# Patient Record
Sex: FEMALE | Race: BLACK OR AFRICAN AMERICAN | Employment: UNEMPLOYED | URBAN - METROPOLITAN AREA
[De-identification: names, ages, dates, MRNs, and addresses within clinical notes are randomized per-mention and may not be internally consistent; named-entity substitution may affect disease eponyms.]

---

## 2019-03-28 ENCOUNTER — OFFICE VISIT (OUTPATIENT)
Dept: INTERNAL MEDICINE CLINIC | Age: 49
End: 2019-03-28

## 2019-03-28 VITALS
BODY MASS INDEX: 33.13 KG/M2 | WEIGHT: 187 LBS | TEMPERATURE: 98.5 F | HEIGHT: 63 IN | OXYGEN SATURATION: 95 % | HEART RATE: 75 BPM | SYSTOLIC BLOOD PRESSURE: 102 MMHG | RESPIRATION RATE: 16 BRPM | DIASTOLIC BLOOD PRESSURE: 45 MMHG

## 2019-03-28 DIAGNOSIS — N64.4 BREAST PAIN, LEFT: ICD-10-CM

## 2019-03-28 DIAGNOSIS — R10.30 LOWER ABDOMINAL PAIN: Primary | ICD-10-CM

## 2019-03-28 DIAGNOSIS — Z13.21 SCREENING FOR ENDOCRINE, NUTRITIONAL, METABOLIC AND IMMUNITY DISORDER: ICD-10-CM

## 2019-03-28 DIAGNOSIS — J45.20 MILD INTERMITTENT ASTHMA WITHOUT COMPLICATION: ICD-10-CM

## 2019-03-28 DIAGNOSIS — Z87.19 HISTORY OF UMBILICAL HERNIA: ICD-10-CM

## 2019-03-28 DIAGNOSIS — Z13.220 SCREENING FOR LIPID DISORDERS: ICD-10-CM

## 2019-03-28 DIAGNOSIS — N60.02 BREAST CYST, LEFT: ICD-10-CM

## 2019-03-28 DIAGNOSIS — Z13.0 SCREENING FOR ENDOCRINE, NUTRITIONAL, METABOLIC AND IMMUNITY DISORDER: ICD-10-CM

## 2019-03-28 DIAGNOSIS — Z86.018 HISTORY OF UTERINE FIBROID: ICD-10-CM

## 2019-03-28 DIAGNOSIS — R82.4 KETONURIA: ICD-10-CM

## 2019-03-28 DIAGNOSIS — Z76.89 ENCOUNTER TO ESTABLISH CARE: ICD-10-CM

## 2019-03-28 DIAGNOSIS — Z13.29 SCREENING FOR ENDOCRINE, NUTRITIONAL, METABOLIC AND IMMUNITY DISORDER: ICD-10-CM

## 2019-03-28 DIAGNOSIS — Z13.228 SCREENING FOR ENDOCRINE, NUTRITIONAL, METABOLIC AND IMMUNITY DISORDER: ICD-10-CM

## 2019-03-28 DIAGNOSIS — Z12.39 ENCOUNTER FOR SCREENING FOR MALIGNANT NEOPLASM OF BREAST: ICD-10-CM

## 2019-03-28 LAB
BILIRUB UR QL STRIP: NORMAL
GLUCOSE UR-MCNC: NORMAL MG/DL
KETONES P FAST UR STRIP-MCNC: NORMAL MG/DL
PH UR STRIP: 6 [PH] (ref 4.6–8)
PROT UR QL STRIP: NEGATIVE
SP GR UR STRIP: 1.03 (ref 1–1.03)
UA UROBILINOGEN AMB POC: NORMAL (ref 0.2–1)
URINALYSIS CLARITY POC: CLEAR
URINALYSIS COLOR POC: NORMAL
URINE BLOOD POC: NEGATIVE
URINE LEUKOCYTES POC: NEGATIVE
URINE NITRITES POC: NEGATIVE

## 2019-03-28 RX ORDER — ACETAMINOPHEN AND CODEINE PHOSPHATE 300; 30 MG/1; MG/1
1 TABLET ORAL
Qty: 20 TAB | Refills: 0 | Status: SHIPPED | OUTPATIENT
Start: 2019-03-28 | End: 2019-03-31

## 2019-03-28 RX ORDER — ALBUTEROL SULFATE 90 UG/1
2 AEROSOL, METERED RESPIRATORY (INHALATION)
Qty: 1 INHALER | Refills: 0 | Status: SHIPPED | OUTPATIENT
Start: 2019-03-28 | End: 2021-08-10

## 2019-03-28 RX ORDER — FLUOXETINE HYDROCHLORIDE 20 MG/1
CAPSULE ORAL
Refills: 0 | COMMUNITY
Start: 2019-03-18 | End: 2021-08-10

## 2019-03-28 RX ORDER — TRAZODONE HYDROCHLORIDE 50 MG/1
TABLET ORAL
Refills: 0 | COMMUNITY
Start: 2019-03-18 | End: 2021-08-10

## 2019-03-28 RX ORDER — CEPHALEXIN 500 MG/1
CAPSULE ORAL
Refills: 0 | COMMUNITY
Start: 2019-03-21 | End: 2019-04-12 | Stop reason: ALTCHOICE

## 2019-03-28 RX ORDER — TRAMADOL HYDROCHLORIDE 50 MG/1
TABLET ORAL
Refills: 0 | COMMUNITY
Start: 2019-03-21 | End: 2019-03-28 | Stop reason: ALTCHOICE

## 2019-03-28 RX ORDER — SULFAMETHOXAZOLE AND TRIMETHOPRIM 800; 160 MG/1; MG/1
TABLET ORAL
Refills: 0 | COMMUNITY
Start: 2019-03-21 | End: 2019-04-12 | Stop reason: ALTCHOICE

## 2019-03-28 NOTE — PROGRESS NOTES
Pt is here for   Chief Complaint   Patient presents with    Abdominal Pain     pt states the bottom of the stomach    Skin Problem     Abscess on left breast     Pt states pain level is a 9/10 left breast abscess. 1. Have you been to the ER, urgent care clinic since your last visit? Hospitalized since your last visit? No    2. Have you seen or consulted any other health care providers outside of the [address] [place] [address] since your last visit? Include any pap smears or colon screening.  No

## 2019-03-28 NOTE — PROGRESS NOTES
Twan Sal is a 52 y.o. female and presents with Abdominal Pain (pt states the bottom of the stomach); Skin Problem (Abscess on left breast); and New Order (Nebulizer)    Subjective:  Pt here to establish care. Has had recurrent abscess in her left breast, occurs every 3 -4 years. This episode started 3 weeks ago. Tried warm compresses with little relief. Tried ibuprofen and tylenol. Went to [facility] ER, about 2 weeks ago, given keflex, bactrim, and tramadol; still with left breast pain. Tried eating with meds, but feels lower abd pain from tramadol or trazodone. Asthma Review:  The patient is being seen for follow up of asthma,  currently stable. Asthma symptoms occur: infrequently. Wheezing when present is described as mild and easily relieved with rescue bronchodilator. The patient reports use of a steroid inhaler. Frequency of use of quick-relief meds: rarely. Regimen compliance: The patient reports adherence to this regimen. Lower abd pain, ongoing pain x 1 week. Associated with diarrhea and lower back pain. Lastly concerned for heavier menses and cramping. H/o fibroids. Review of Systems  Constitutional: negative for fevers, chills, anorexia and weight loss  Respiratory:  negative for  Hemoptysis and dyspnea  CV:   negative for chest pain, palpitations, and lower extremity edema  GI:   negative for nausea, vomiting and melena  Endo:               negative for polyuria,polydipsia,polyphagia, and heat intolerance  Genitourinary: negative for frequency, urgency, dysuria, retention, and hematuria  Integument:  negative for rash, ulcerations, and pruritus  Hematologic:  negative for easy bruising and bleeding  Musculoskel: +LBP.  negative for muscle weakness and joint pain/swelling  Neurological:  negative for headaches, dizziness, vertigo,and memory/gait problems  Behavl/Psych: negative for feelings of anxiety, depression, suicide, and mood changes    Past Medical History:   Diagnosis Date    Substance abuse [facility])      Past Surgical History:   Procedure Laterality Date    HX  SECTION      HX TUBAL LIGATION       Social History     Socioeconomic History    Marital status: SINGLE     Spouse name: Not on file    Number of children: Not on file    Years of education: Not on file    Highest education level: Not on file   Tobacco Use    Smoking status: Current Every Day Smoker     Packs/day: 2.00     Years: 15.00     Pack years: 30.00     Types: Cigarettes    Smokeless tobacco: Never Used    Tobacco comment: declineds education   Substance and Sexual Activity    Alcohol use: Yes     Alcohol/week: 0.5 oz     Types: 1 Standard drinks or equivalent per week     Comment: once a week    Drug use: Yes     Types: Cocaine     Comment: relapsed today. Used crack today for first time since     Sexual activity: Yes     Partners: Male   Social History Narrative    55year old AA female admitted voluntarily for CC of SI. PT has a 9th grade Ed., lives alone and is unemployed, She is followed at [facility] by therapist Elba Pritchard and psychiatrist Dr. Judy Garcia.  Pt. Has been using \"crack and alcohol, since I ran out of money to pay for my meds,\"      Family History   Problem Relation Age of Onset    Hypertension Sister     Diabetes Sister     Hypertension Brother     Diabetes Brother     Hypertension Mother     Hypertension Father      Current Outpatient Medications   Medication Sig Dispense Refill    FLUoxetine (PROZAC) 20 mg capsule take 1 capsule by mouth every morning  0    trimethoprim-sulfamethoxazole (BACTRIM DS, SEPTRA DS) 160-800 mg per tablet take 1 tablet by mouth every 12 hours until finished  0    traZODone (DESYREL) 50 mg tablet take 1 tablet by mouth at bedtime if needed  0    cephALEXin (KEFLEX) 500 mg capsule take 1 capsule by mouth every 6 hours  0    albuterol (PROVENTIL HFA, VENTOLIN HFA, PROAIR HFA) 90 mcg/actuation inhaler Take 2 Puffs by inhalation every four (4) hours as needed for Wheezing or Shortness of Breath (persistent coughing). 1 Inhaler 0    acetaminophen-codeine (TYLENOL #3) 300-30 mg per tablet Take 1 Tab by mouth every six (6) hours as needed for Pain for up to 3 days. Max Daily Amount: 4 Tabs. 20 Tab 0    QUEtiapine (SEROQUEL) 100 mg tablet Take 1 Tab by mouth two (2) times a day. Indications: DEPRESSION ASSOCIATED WITH BIPOLAR DISORDER 60 Tab 0     Allergies   Allergen Reactions    Vistaril [Hydroxyzine Pamoate] Hives       Objective:  Visit Vitals  /45 (BP 1 Location: Right arm, BP Patient Position: Sitting)   Pulse 75   Temp 98.5 °F (36.9 °C) (Oral)   Resp 16   Ht 5' 3\" (1.6 m)   Wt 187 lb (84.8 kg)   LMP 03/07/2019 (Approximate)   SpO2 95%   BMI 33.13 kg/m²     Wt Readings from Last 3 Encounters:   03/28/19 187 lb (84.8 kg)   08/07/13 169 lb (76.7 kg)   06/25/13 165 lb 12.8 oz (75.2 kg)     Physical Exam:   General appearance - alert, well appearing, and in no distress. Mental status - A/O x 4, normal mood and affect. Neck -Supple ,normal CSP. FROM, non-tender. No significant adenopathy/thyromegaly. No JVD. Breast- left breast with ayanna sized cyst in areola at 4' clock. No nipple discharge or erythema. TTP. Right breast without any masses, skin changes, or nipple discharge. Chest - CTA. Symmetric chest rise. No wheezing, rales or rhonchi. Heart - Normal rate, regular rhythm. Normal S1, S2. No MGR or clicks. Abdomen - Soft, obese, distended. Normoactive BS in all quadrants. NT, no mass, hernia, or HSM. Ext- Radial, DP pulses, 2+ bilaterally. No pedal edema, clubbing, or cyanosis. Skin-Warm and dry. No hyperpigmentation, ulcerations, or suspicious lesions. Neuro - Normal speech, no focal findings or movement disorder. Normal strength, gait, and muscle tone. Assessment/Plan:  UA neg, but ketones and glucose present. Labs ordered, advised to AVOID soda drinking. Tylenol #3 prescribed and diagnostic mammo ordered.  Finish antibiotics and continue warm compresses. I spent greater than 50% of 45 minute visit counseling patient about diagnostic results, impressions, prognosis, risk/benefits of treatment options, medication management and adequate follow-up, importance of adherence to treatment plan, and risk factor reduction. Medication Side Effects and Warnings were discussed with patient: yes   Patient Labs were reviewed: yes  Patient Past Records were reviewed: yes    See below for other orders   Follow-up and Dispositions    · Return in about 2 weeks (around 4/11/2019) for asthma, mammo f/u, u/s f/u, lab review. ICD-10-CM ICD-9-CM    1. Lower abdominal pain R10.30 789.09 AMB POC URINALYSIS DIP STICK AUTO W/ MICRO      US ABD LTD      US PELV NON OB W TV   2. Encounter to establish care Z76.89 V65.8    3. Breast cyst, left N60.02 610.0 REY MAMMO LT DX INCL CAD   4. Encounter for screening for malignant neoplasm of breast Z12.31 V76.10 REY MAMMO BI SCREENING INCL CAD   5. Ketonuria R82.4 791.6 HEMOGLOBIN A1C WITH EAG   6. History of uterine fibroid Z86.018 V13.29 REFERRAL TO GYNECOLOGY   7. History of umbilical hernia I65.26 X68.35 US ABD LTD   8. Screening for lipid disorders Z13.220 V77.91 LIPID PANEL   9. Screening for endocrine, nutritional, metabolic and immunity disorder L33.70 H44.97 METABOLIC PANEL, COMPREHENSIVE    Z13.21  CBC WITH AUTOMATED DIFF    Z13.228  TSH 3RD GENERATION    Z13.0     10. Mild intermittent asthma without complication L33.95 606.17 albuterol (PROVENTIL HFA, VENTOLIN HFA, PROAIR HFA) 90 mcg/actuation inhaler   11. Breast pain, left N64.4 611.71 acetaminophen-codeine (TYLENOL #3) 300-30 mg per tablet     Orders Placed This Encounter    REY MAMMO BI SCREENING INCL CAD     Standing Status:   Future     Standing Expiration Date:   4/28/2020     Order Specific Question:   Reason for Exam     Answer:   breast cancer screening     Order Specific Question:   Is Patient Pregnant?      Answer:   No    US ABD LTD Standing Status:   Future     Standing Expiration Date:   4/28/2020     Order Specific Question:   Is Patient Pregnant? Answer:   No     Order Specific Question:   Reason for Exam     Answer:   lower abd pain, h/o uterine fibroids     Order Specific Question:   Specific Body Part     Answer:   lower abdomen    US PELV NON OB W TV     Standing Status:   Future     Standing Expiration Date:   4/28/2020     Order Specific Question:   Is Patient Pregnant? Answer:   No     Order Specific Question:   Reason for Exam     Answer:   lower abd pain    REY MAMMO LT DX INCL CAD     Standing Status:   Future     Standing Expiration Date:   4/28/2020     Order Specific Question:   Reason for Exam     Answer:   left breast mass     Order Specific Question:   Is Patient Pregnant?      Answer:   No    METABOLIC PANEL, COMPREHENSIVE    CBC WITH AUTOMATED DIFF    HEMOGLOBIN A1C WITH EAG    LIPID PANEL    TSH 3RD GENERATION    REFERRAL TO GYNECOLOGY     Referral Priority:   Routine     Referral Type:   Consultation     Referral Reason:   Specialty Services Required     Referred to Provider:   Jamie Vigil MD     Number of Visits Requested:   1    AMB POC URINALYSIS DIP STICK AUTO W/ MICRO    FLUoxetine (PROZAC) 20 mg capsule     Sig: take 1 capsule by mouth every morning     Refill:  0    trimethoprim-sulfamethoxazole (BACTRIM DS, SEPTRA DS) 160-800 mg per tablet     Sig: take 1 tablet by mouth every 12 hours until finished     Refill:  0    DISCONTD: traMADol (ULTRAM) 50 mg tablet     Sig: take 1 tablet by mouth every 6 to 8 hours if needed for pain     Refill:  0    traZODone (DESYREL) 50 mg tablet     Sig: take 1 tablet by mouth at bedtime if needed     Refill:  0    cephALEXin (KEFLEX) 500 mg capsule     Sig: take 1 capsule by mouth every 6 hours     Refill:  0    albuterol (PROVENTIL HFA, VENTOLIN HFA, PROAIR HFA) 90 mcg/actuation inhaler     Sig: Take 2 Puffs by inhalation every four (4) hours as needed for Wheezing or Shortness of Breath (persistent coughing). Dispense:  1 Inhaler     Refill:  0    acetaminophen-codeine (TYLENOL #3) 300-30 mg per tablet     Sig: Take 1 Tab by mouth every six (6) hours as needed for Pain for up to 3 days. Max Daily Amount: 4 Tabs. Dispense:  20 Tab     Refill:  0       Mayda MARY Alfaro expressed understanding of plan. An After Visit Summary was offered/printed and given to the patient.

## 2019-03-28 NOTE — PATIENT INSTRUCTIONS
Uterine Fibroids: Care Instructions  Your Care Instructions    Uterine fibroids are growths in the uterus. Fibroids aren't cancer. Doctors don't know what causes fibroids. Fibroids are very common in women during their childbearing years. Fibroids can grow on the inside of the uterus, in the muscle wall of the uterus, or near the outside wall of the uterus. In some women, fibroids cause painful cramps and heavy periods. In these cases, taking anti-inflammatory medicines, birth control pills, or using an intrauterine device (IUD) often helps decrease symptoms. Sometimes surgery is needed to treat fibroids. But if you are near menopause, you may want to wait and see if your symptoms get better. Most fibroids shrink and go away after menopause, when your menstrual periods stop completely. Follow-up care is a key part of your treatment and safety. Be sure to make and go to all appointments, and call your doctor if you are having problems. It's also a good idea to know your test results and keep a list of the medicines you take. How can you care for yourself at home? · If your doctor gave you medicine, take it as exactly as prescribed. Be safe with medicines. Call your doctor if you think you are having a problem with your medicine. · Take anti-inflammatory medicines for pain. These include ibuprofen (Advil, Motrin) and naproxen (Aleve). Read and follow all instructions on the label. · Use heat, such as a hot water bottle or a heating pad set on low, or a warm bath to relax tense muscles and relieve cramping. Put a thin cloth between the heating pad and your skin. Never go to sleep with a heating pad on. · Lie down and put a pillow under your knees. Or, lie on your side and bring your knees up to your chest. These positions may help relieve belly pain or pressure. · Keep track of how many sanitary pads or tampons you use each day. · Get at least 30 minutes of exercise on most days of the week.  Walking is a good choice. You also may want to do other activities, such as running, swimming, cycling, or playing tennis or team sports. · If you bleed longer than usual or have heavy bleeding, take a daily multivitamin with iron. When should you call for help? Call your doctor now or seek immediate medical care if:    · You have severe vaginal bleeding.     · You have new or worse belly or pelvic pain.    Watch closely for changes in your health, and be sure to contact your doctor if:    · You have unusual vaginal bleeding.     · You do not get better as expected. Where can you learn more? Go to http://jyoti-kashmir.info/. Enter B121 in the search box to learn more about \"Uterine Fibroids: Care Instructions. \"  Current as of: May 14, 2018  Content Version: 11.9  © 6185-5212 [employer]. Care instructions adapted under license by [employer] (which disclaims liability or warranty for this information). If you have questions about a medical condition or this instruction, always ask your healthcare professional. [address] any warranty or liability for your use of this information. Learning About Asthma Triggers  What are asthma triggers? When you have asthma, certain things can make your symptoms worse. These are called triggers. Learn what triggers an asthma attack for you, and avoid the triggers when you can. Common triggers include colds, smoke, air pollution, dust, pollen, pets, stress, and cold air. How do asthma triggers affect you? Triggers can make it harder for your lungs to work as they should. They can lead to sudden breathing problems and other symptoms. When you are around a trigger, an asthma attack is more likely. If your symptoms are severe, you may need emergency treatment or have to go to the hospital for treatment. What can you do to avoid triggers? The first thing is to know your triggers.   When you are having symptoms, note the things around you that might be causing them. Then look for patterns that may be triggering your symptoms. Record your triggers on a piece of paper or in an asthma diary. When you have your list of possible triggers, work with your doctor to find ways to avoid them. Avoid colds and flu. Get a pneumococcal vaccine shot. If you have had one before, ask your doctor whether you need a second dose. Get a flu vaccine every year, as soon as it's available. If you must be around people with colds or the flu, wash your hands often. Here are some ways to avoid a few common triggers. · Do not smoke or allow others to smoke around you. If you need help quitting, talk to your doctor about stop-smoking programs and medicines. These can increase your chances of quitting for good. · If there is a lot of pollution, pollen, or dust outside, stay at home and keep your windows closed. Use an air conditioner or air filter in your home. Check your local weather report or newspaper for air quality and pollen reports. What else should you know? · Take your controller medicine every day, not just when you have symptoms. It helps prevent problems before they occur. · Your doctor may suggest that you check how well your lungs are working by measuring your peak expiratory flow (PEF) throughout the day. Your PEF may drop when you are near things that trigger symptoms. Where can you learn more? Go to http://jyoti-kashmir.info/. Enter A498 in the search box to learn more about \"Learning About Asthma Triggers. \"  Current as of: September 5, 2018  Content Version: 11.9  © 7540-6421 [employer]. Care instructions adapted under license by [employer] (which disclaims liability or warranty for this information).  If you have questions about a medical condition or this instruction, always ask your healthcare professional. [place] [address] any warranty or liability for your use of this information.

## 2019-04-12 ENCOUNTER — OFFICE VISIT (OUTPATIENT)
Dept: INTERNAL MEDICINE CLINIC | Age: 49
End: 2019-04-12

## 2019-04-12 VITALS
DIASTOLIC BLOOD PRESSURE: 60 MMHG | TEMPERATURE: 98.1 F | SYSTOLIC BLOOD PRESSURE: 104 MMHG | HEIGHT: 63 IN | OXYGEN SATURATION: 99 % | WEIGHT: 185.8 LBS | BODY MASS INDEX: 32.92 KG/M2 | RESPIRATION RATE: 18 BRPM | HEART RATE: 62 BPM

## 2019-04-12 DIAGNOSIS — N60.02 BREAST CYST, LEFT: ICD-10-CM

## 2019-04-12 DIAGNOSIS — N64.4 BREAST PAIN, LEFT: ICD-10-CM

## 2019-04-12 DIAGNOSIS — J45.40 MODERATE PERSISTENT ASTHMA WITHOUT COMPLICATION: Primary | ICD-10-CM

## 2019-04-12 RX ORDER — FLUTICASONE PROPIONATE 110 UG/1
2 AEROSOL, METERED RESPIRATORY (INHALATION) EVERY 12 HOURS
Qty: 1 INHALER | Refills: 11 | Status: SHIPPED | OUTPATIENT
Start: 2019-04-12 | End: 2021-08-10

## 2019-04-12 RX ORDER — PREDNISONE 20 MG/1
20 TABLET ORAL
Qty: 5 TAB | Refills: 0 | Status: SHIPPED | OUTPATIENT
Start: 2019-04-12 | End: 2021-08-10

## 2019-04-12 NOTE — PROGRESS NOTES
Michael Pacheco is a 52 y.o. female and presents with Asthma and Results    Subjective:  Pt here to f/u asthma and left breast pain. Taking albuterol BID for SOB and wheezing. Feels the same as last visit, especially with left breast pain. Has NOT had mammogram or labs yet due to scheduling conflict, but finished antibiotics. No acute complaints otherwise. Denies side effects from medication. Review of Systems  Constitutional: negative for fevers, chills, anorexia and weight loss  Respiratory:  negative for cough, hemoptysis, dyspnea, and wheezing  CV:   negative for chest pain, palpitations, and lower extremity edema  GI:   negative for nausea, vomiting, diarrhea, abdominal pain, and melena  Endo:               negative for polyuria,polydipsia,polyphagia, and heat intolerance  Genitourinary: negative for frequency, urgency, dysuria, retention, and hematuria  Integument:  negative for rash, ulcerations, and pruritus  Hematologic:  negative for easy bruising and bleeding  Musculoskel: negative for arthralgias, muscle weakness,and joint pain/swelling  Neurological:  negative for headaches, dizziness, vertigo,and memory/gait problems  Behavl/Psych: negative for feelings of anxiety, depression, suicide, and mood changes    Past Medical History:   Diagnosis Date    [facility]      Past Surgical History:   Procedure Laterality Date    HX  SECTION      HX TUBAL LIGATION  2011     Social History     Socioeconomic History    Marital status: SINGLE     Spouse name: Not on file    Number of children: Not on file    Years of education: Not on file    Highest education level: Not on file   Tobacco Use    Smoking status: Current Every Day Smoker     Packs/day: 2.00     Years: 15.00     Pack years: 30.00     Types: Cigarettes    Smokeless tobacco: Never Used    Tobacco comment: declineds education   Substance and Sexual Activity    Alcohol use:  Yes     Alcohol/week: 0.5 oz     Types: 1 Standard drinks or equivalent per week     Comment: once a week    Drug use: Yes     Types: Cocaine     Comment: relapsed today. Used crack today for first time since 7/28    Sexual activity: Yes     Partners: Male   Social History Narrative    55year old AA female admitted voluntarily for CC of SI. PT has a 9th grade Ed., lives alone and is unemployed, She is followed at [facility] by therapist Kandi Biswas and psychiatrist Dr. Antoine Márquez. Pt. Has been using \"crack and alcohol, since I ran out of money to pay for my meds,\"      Family History   Problem Relation Age of Onset    Hypertension Sister     Diabetes Sister     Hypertension Brother     Diabetes Brother     Hypertension Mother     Hypertension Father      Current Outpatient Medications   Medication Sig Dispense Refill    fluticasone propionate (FLOVENT HFA) 110 mcg/actuation inhaler Take 2 Puffs by inhalation every twelve (12) hours. 1 Inhaler 11    predniSONE (DELTASONE) 20 mg tablet Take 20 mg by mouth daily (with breakfast). 5 Tab 0    traZODone (DESYREL) 50 mg tablet take 1 tablet by mouth at bedtime if needed  0    albuterol (PROVENTIL HFA, VENTOLIN HFA, PROAIR HFA) 90 mcg/actuation inhaler Take 2 Puffs by inhalation every four (4) hours as needed for Wheezing or Shortness of Breath (persistent coughing). 1 Inhaler 0    FLUoxetine (PROZAC) 20 mg capsule take 1 capsule by mouth every morning  0    QUEtiapine (SEROQUEL) 100 mg tablet Take 1 Tab by mouth two (2) times a day.  Indications: DEPRESSION ASSOCIATED WITH BIPOLAR DISORDER 60 Tab 0     Allergies   Allergen Reactions    Vistaril [Hydroxyzine Pamoate] Hives       Objective:  Visit Vitals  /60 (BP 1 Location: Left arm, BP Patient Position: Sitting)   Pulse 62   Temp 98.1 °F (36.7 °C) (Oral)   Resp 18   Ht 5' 3\" (1.6 m)   Wt 185 lb 12.8 oz (84.3 kg)   LMP  (LMP Unknown)   SpO2 99%   BMI 32.91 kg/m²     Wt Readings from Last 3 Encounters:   04/12/19 185 lb 12.8 oz (84.3 kg)   03/28/19 187 lb (84.8 kg)   08/07/13 169 lb (76.7 kg)     Physical Exam:   General appearance - alert, well appearing, and in no distress. Mental status - A/O x 4, aloof and anxious mood and affect. Neck -Supple ,normal CSP. FROM, non-tender. No significant adenopathy/thyromegaly. No JVD. Breast- left breast with grape sized cyst palpated at 5 o'clock on areola. Mild TTP. No erythema or drainage. Chest - CTA. Symmetric chest rise. No wheezing, rales or rhonchi. Heart - Normal rate, regular rhythm. Normal S1, S2. No MGR or clicks. Abdomen - Soft,non-distended. Normoactive BS in all quadrants. NT, no mass or HSM. Ext- Radial, DP pulses, 2+ bilaterally. No pedal edema, clubbing, or cyanosis. Skin-Warm and dry. No hyperpigmentation, ulcerations, or suspicious lesions. Neuro - Normal speech, no focal findings or movement disorder. Normal strength, gait, and muscle tone. Assessment/Plan:  Pt asked to get MAMMO ordered last OV. No further pain med or antibiotic needed at this time. flovent added since use of rescue daily and 5 days of prednisone started. Medication Side Effects and Warnings were discussed with patient: yes   Patient Labs were reviewed: yes  Patient Past Records were reviewed: yes    See below for other orders   Follow-up and Dispositions    · Return in about 1 month (around 5/10/2019) for mammo and lab review, asthma f/u. Pt has given consent verbally while in office for [employer]. ICD-10-CM ICD-9-CM    1. Moderate persistent asthma without complication H81.86 314.79 fluticasone propionate (FLOVENT HFA) 110 mcg/actuation inhaler      predniSONE (DELTASONE) 20 mg tablet   2. Breast cyst, left N60.02 610.0    3. Breast pain, left N64.4 611.71      Orders Placed This Encounter    fluticasone propionate (FLOVENT HFA) 110 mcg/actuation inhaler     Sig: Take 2 Puffs by inhalation every twelve (12) hours.      Dispense:  1 Inhaler     Refill:  11    predniSONE (DELTASONE) 20 mg tablet Sig: Take 20 mg by mouth daily (with breakfast). Dispense:  5 Tab     Refill:  0       Mayda Alfaro expressed understanding of plan. An After Visit Summary was offered/printed and given to the patient.

## 2019-04-12 NOTE — PROGRESS NOTES
Chief Complaint   Patient presents with    Asthma    Results     1. Have you been to the ER, urgent care clinic since your last visit? Hospitalized since your last visit? No    2. Have you seen or consulted any other health care providers outside of the [address] [place] [address] since your last visit? Include any pap smears or colon screening.  No

## 2019-04-12 NOTE — PATIENT INSTRUCTIONS
Fibrocystic Breast Changes: Care Instructions  Your Care Instructions  Fibrocystic breast changes cause many small lumps to form in your breast. Some areas of your breast may feel thicker or denser than other areas. Your breasts also may feel sore or tender. You may notice lumps in both breasts around the nipple and in the upper, outer part of the breasts, especially before your menstrual period. The lumps may come and go and change size in just a few days. Fibrocystic breast changes are normal and are not cancer. Treatment is not usually needed. If you have a hard, grainy lump, unusual pain, or nipple discharge, your doctor may order tests to look for a more serious problem. Talk to your doctor about the need for regular mammograms. Follow-up care is a key part of your treatment and safety. Be sure to make and go to all appointments, and call your doctor if you are having problems. It's also a good idea to know your test results and keep a list of the medicines you take. How can you care for yourself at home? · Take an over-the-counter pain medicine, such as acetaminophen (Tylenol), ibuprofen (Advil, Motrin), or naproxen (Aleve). Read and follow all instructions on the label. · Do not take two or more pain medicines at the same time unless the doctor told you to. Many pain medicines have acetaminophen, which is Tylenol. Too much acetaminophen (Tylenol) can be harmful. · Wear a supportive bra, such as a sports bra or jog bra. · You may want to limit caffeine. Some women say that cutting back on caffeine reduces breast tenderness. · A diet very low in fat (about 15% of daily diet) may reduce breast tenderness. Talk to your doctor about whether you should try a very low-fat diet. When should you call for help?   Watch closely for changes in your health, and be sure to contact your doctor if:    · You do not get better as expected.     · Your breast has changed.     · You have pain in your breast.     · You have a discharge from your nipple.     · A breast lump changes or does not go away. Where can you learn more? Go to http://jyoti-kashmir.info/. Enter A038 in the search box to learn more about \"Fibrocystic Breast Changes: Care Instructions. \"  Current as of: May 14, 2018  Content Version: 11.9  © 9850-9402 [employer]. Care instructions adapted under license by [employer] (which disclaims liability or warranty for this information). If you have questions about a medical condition or this instruction, always ask your healthcare professional. [place] [address] any warranty or liability for your use of this information. Breast Lumps: Care Instructions  Your Care Instructions  Breast lumps are common, especially in women between ages 27 and 48. Many women's breasts feel lumpy and tender before their menstrual period. Women also may have lumps when they are breastfeeding. Breast lumps may go away after menopause. All new breast lumps in women after menopause should be checked by a doctor. Although lumps may be normal for you, it is important to have your doctor check any lump or thickness that is not like the rest of your breast to make sure it is not cancer. A lump may be larger, harder, or different from the rest of your breast tissue. Follow-up care is a key part of your treatment and safety. Be sure to make and go to all appointments, and call your doctor if you are having problems. It's also a good idea to know your test results and keep a list of the medicines you take. How can you care for yourself at home? · Make an appointment to have a mammogram and other follow-up visits as recommended by your doctor. When should you call for help?   Watch closely for changes in your health, and be sure to contact your doctor if:    · You do not get better as expected.     · Your breast has changed.     · You have pain in your breast.     · You have a discharge from your nipple.     · A breast lump changes or does not go away. Where can you learn more? Go to http://jyoti-kashmir.info/. Enter I949 in the search box to learn more about \"Breast Lumps: Care Instructions. \"  Current as of: May 14, 2018  Content Version: 11.9  © 0292-8058 [employer]. Care instructions adapted under license by [employer] (which disclaims liability or warranty for this information). If you have questions about a medical condition or this instruction, always ask your healthcare professional. [place] [address] any warranty or liability for your use of this information.

## 2019-09-02 ENCOUNTER — IP HISTORICAL/CONVERTED ENCOUNTER (OUTPATIENT)
Dept: OTHER | Age: 49
End: 2019-09-02

## 2021-08-02 ENCOUNTER — HOSPITAL ENCOUNTER (EMERGENCY)
Age: 51
Discharge: ANOTHER HOSPITAL | End: 2021-08-02
Attending: EMERGENCY MEDICINE | Admitting: EMERGENCY MEDICINE
Payer: MEDICAID

## 2021-08-02 ENCOUNTER — HOSPITAL ENCOUNTER (INPATIENT)
Age: 51
LOS: 8 days | Discharge: OTHER INSTITUTION | DRG: 751 | End: 2021-08-10
Attending: PSYCHIATRY & NEUROLOGY | Admitting: PSYCHIATRY & NEUROLOGY
Payer: MEDICAID

## 2021-08-02 VITALS
BODY MASS INDEX: 30.12 KG/M2 | RESPIRATION RATE: 16 BRPM | OXYGEN SATURATION: 94 % | HEART RATE: 72 BPM | SYSTOLIC BLOOD PRESSURE: 138 MMHG | TEMPERATURE: 98.2 F | HEIGHT: 63 IN | DIASTOLIC BLOOD PRESSURE: 76 MMHG | WEIGHT: 170 LBS

## 2021-08-02 DIAGNOSIS — T50.902A MEDICATION OVERDOSE, INTENTIONAL SELF-HARM, INITIAL ENCOUNTER (HCC): Primary | ICD-10-CM

## 2021-08-02 DIAGNOSIS — F19.10 POLYSUBSTANCE ABUSE (HCC): ICD-10-CM

## 2021-08-02 DIAGNOSIS — R45.851 SUICIDAL IDEATION: ICD-10-CM

## 2021-08-02 DIAGNOSIS — R45.850 HOMICIDAL IDEATIONS: ICD-10-CM

## 2021-08-02 DIAGNOSIS — Z00.8 EVALUATION BY PSYCHIATRIC SERVICE REQUIRED: ICD-10-CM

## 2021-08-02 DIAGNOSIS — R44.3 HALLUCINATIONS: ICD-10-CM

## 2021-08-02 PROBLEM — F32.A DEPRESSION: Status: ACTIVE | Noted: 2021-08-02

## 2021-08-02 PROBLEM — F33.9 MAJOR DEPRESSIVE DISORDER, RECURRENT, UNSPECIFIED (HCC): Status: ACTIVE | Noted: 2021-08-02

## 2021-08-02 LAB
ALBUMIN SERPL-MCNC: 3.6 G/DL (ref 3.5–5)
ALBUMIN/GLOB SERPL: 0.9 {RATIO} (ref 1.1–2.2)
ALP SERPL-CCNC: 74 U/L (ref 45–117)
ALT SERPL-CCNC: 30 U/L (ref 12–78)
AMPHET UR QL SCN: NEGATIVE
ANION GAP SERPL CALC-SCNC: 9 MMOL/L (ref 5–15)
APAP SERPL-MCNC: <2 UG/ML (ref 10–30)
APPEARANCE UR: CLEAR
AST SERPL-CCNC: 34 U/L (ref 15–37)
ATRIAL RATE: 61 BPM
BACTERIA URNS QL MICRO: NEGATIVE /HPF
BARBITURATES UR QL SCN: NEGATIVE
BASOPHILS # BLD: 0 K/UL (ref 0–0.1)
BASOPHILS NFR BLD: 1 % (ref 0–1)
BENZODIAZ UR QL: NEGATIVE
BILIRUB SERPL-MCNC: 0.3 MG/DL (ref 0.2–1)
BILIRUB UR QL: NEGATIVE
BUN SERPL-MCNC: 9 MG/DL (ref 6–20)
BUN/CREAT SERPL: 12 (ref 12–20)
CALCIUM SERPL-MCNC: 8.8 MG/DL (ref 8.5–10.1)
CALCULATED P AXIS, ECG09: 51 DEGREES
CALCULATED R AXIS, ECG10: 45 DEGREES
CALCULATED T AXIS, ECG11: 42 DEGREES
CANNABINOIDS UR QL SCN: NEGATIVE
CHLORIDE SERPL-SCNC: 106 MMOL/L (ref 97–108)
CK MB CFR SERPL CALC: 0.5 % (ref 0–2.5)
CK MB SERPL-MCNC: 1.1 NG/ML (ref 5–25)
CK SERPL-CCNC: 236 U/L (ref 26–192)
CO2 SERPL-SCNC: 29 MMOL/L (ref 21–32)
COCAINE UR QL SCN: POSITIVE
COLOR UR: NORMAL
CREAT SERPL-MCNC: 0.76 MG/DL (ref 0.55–1.02)
DIAGNOSIS, 93000: NORMAL
DIFFERENTIAL METHOD BLD: ABNORMAL
DRUG SCRN COMMENT,DRGCM: ABNORMAL
EOSINOPHIL # BLD: 0.1 K/UL (ref 0–0.4)
EOSINOPHIL NFR BLD: 2 % (ref 0–7)
EPITH CASTS URNS QL MICRO: NORMAL /LPF
ERYTHROCYTE [DISTWIDTH] IN BLOOD BY AUTOMATED COUNT: 21.1 % (ref 11.5–14.5)
ETHANOL SERPL-MCNC: 48 MG/DL
FLUAV RNA SPEC QL NAA+PROBE: NOT DETECTED
FLUBV RNA SPEC QL NAA+PROBE: NOT DETECTED
GLOBULIN SER CALC-MCNC: 4.2 G/DL (ref 2–4)
GLUCOSE SERPL-MCNC: 80 MG/DL (ref 65–100)
GLUCOSE UR STRIP.AUTO-MCNC: NEGATIVE MG/DL
HCT VFR BLD AUTO: 36.9 % (ref 35–47)
HGB BLD-MCNC: 10.9 G/DL (ref 11.5–16)
HGB UR QL STRIP: NEGATIVE
IMM GRANULOCYTES # BLD AUTO: 0 K/UL
IMM GRANULOCYTES NFR BLD AUTO: 0 %
KETONES UR QL STRIP.AUTO: NEGATIVE MG/DL
LEUKOCYTE ESTERASE UR QL STRIP.AUTO: NEGATIVE
LYMPHOCYTES # BLD: 1.7 K/UL (ref 0.8–3.5)
LYMPHOCYTES NFR BLD: 43 % (ref 12–49)
MCH RBC QN AUTO: 24.3 PG (ref 26–34)
MCHC RBC AUTO-ENTMCNC: 29.5 G/DL (ref 30–36.5)
MCV RBC AUTO: 82.4 FL (ref 80–99)
METHADONE UR QL: NEGATIVE
MONOCYTES # BLD: 0.4 K/UL (ref 0–1)
MONOCYTES NFR BLD: 10 % (ref 5–13)
NEUTS SEG # BLD: 1.7 K/UL (ref 1.8–8)
NEUTS SEG NFR BLD: 44 % (ref 32–75)
NITRITE UR QL STRIP.AUTO: NEGATIVE
NRBC # BLD: 0 K/UL (ref 0–0.01)
NRBC BLD-RTO: 0 PER 100 WBC
OPIATES UR QL: NEGATIVE
P-R INTERVAL, ECG05: 174 MS
PCP UR QL: NEGATIVE
PH UR STRIP: 6 [PH] (ref 5–8)
PLATELET # BLD AUTO: 303 K/UL (ref 150–400)
PMV BLD AUTO: 10.7 FL (ref 8.9–12.9)
POTASSIUM SERPL-SCNC: 3.4 MMOL/L (ref 3.5–5.1)
PROT SERPL-MCNC: 7.8 G/DL (ref 6.4–8.2)
PROT UR STRIP-MCNC: NEGATIVE MG/DL
Q-T INTERVAL, ECG07: 438 MS
QRS DURATION, ECG06: 84 MS
QTC CALCULATION (BEZET), ECG08: 440 MS
RBC # BLD AUTO: 4.48 M/UL (ref 3.8–5.2)
RBC #/AREA URNS HPF: NORMAL /HPF (ref 0–5)
RBC MORPH BLD: ABNORMAL
SALICYLATES SERPL-MCNC: 3.8 MG/DL (ref 2.8–20)
SARS-COV-2, COV2: NOT DETECTED
SODIUM SERPL-SCNC: 144 MMOL/L (ref 136–145)
SP GR UR REFRACTOMETRY: 1.02 (ref 1–1.03)
UA: UC IF INDICATED,UAUC: NORMAL
UROBILINOGEN UR QL STRIP.AUTO: 1 EU/DL (ref 0.2–1)
VENTRICULAR RATE, ECG03: 61 BPM
WBC # BLD AUTO: 3.9 K/UL (ref 3.6–11)
WBC URNS QL MICRO: NORMAL /HPF (ref 0–4)

## 2021-08-02 PROCEDURE — 80179 DRUG ASSAY SALICYLATE: CPT

## 2021-08-02 PROCEDURE — 93005 ELECTROCARDIOGRAM TRACING: CPT

## 2021-08-02 PROCEDURE — 85025 COMPLETE CBC W/AUTO DIFF WBC: CPT

## 2021-08-02 PROCEDURE — 81001 URINALYSIS AUTO W/SCOPE: CPT

## 2021-08-02 PROCEDURE — 80053 COMPREHEN METABOLIC PANEL: CPT

## 2021-08-02 PROCEDURE — 80143 DRUG ASSAY ACETAMINOPHEN: CPT

## 2021-08-02 PROCEDURE — 96360 HYDRATION IV INFUSION INIT: CPT

## 2021-08-02 PROCEDURE — 99285 EMERGENCY DEPT VISIT HI MDM: CPT

## 2021-08-02 PROCEDURE — 87636 SARSCOV2 & INF A&B AMP PRB: CPT

## 2021-08-02 PROCEDURE — 80307 DRUG TEST PRSMV CHEM ANLYZR: CPT

## 2021-08-02 PROCEDURE — 82553 CREATINE MB FRACTION: CPT

## 2021-08-02 PROCEDURE — 96361 HYDRATE IV INFUSION ADD-ON: CPT

## 2021-08-02 PROCEDURE — 65220000003 HC RM SEMIPRIVATE PSYCH

## 2021-08-02 PROCEDURE — 82077 ASSAY SPEC XCP UR&BREATH IA: CPT

## 2021-08-02 PROCEDURE — 36415 COLL VENOUS BLD VENIPUNCTURE: CPT

## 2021-08-02 PROCEDURE — 90791 PSYCH DIAGNOSTIC EVALUATION: CPT

## 2021-08-02 PROCEDURE — 74011250636 HC RX REV CODE- 250/636: Performed by: EMERGENCY MEDICINE

## 2021-08-02 PROCEDURE — 82550 ASSAY OF CK (CPK): CPT

## 2021-08-02 RX ORDER — LORAZEPAM 2 MG/ML
1 INJECTION INTRAMUSCULAR AS NEEDED
Status: DISCONTINUED | OUTPATIENT
Start: 2021-08-02 | End: 2021-08-02 | Stop reason: HOSPADM

## 2021-08-02 RX ORDER — MAG HYDROX/ALUMINUM HYD/SIMETH 200-200-20
30 SUSPENSION, ORAL (FINAL DOSE FORM) ORAL
Status: DISCONTINUED | OUTPATIENT
Start: 2021-08-02 | End: 2021-08-10 | Stop reason: HOSPADM

## 2021-08-02 RX ORDER — QUETIAPINE FUMARATE 25 MG/1
50 TABLET, FILM COATED ORAL
Status: DISCONTINUED | OUTPATIENT
Start: 2021-08-02 | End: 2021-08-10 | Stop reason: HOSPADM

## 2021-08-02 RX ORDER — IBUPROFEN 400 MG/1
400 TABLET ORAL
Status: DISCONTINUED | OUTPATIENT
Start: 2021-08-02 | End: 2021-08-10 | Stop reason: HOSPADM

## 2021-08-02 RX ORDER — ACETAMINOPHEN 325 MG/1
650 TABLET ORAL
Status: DISCONTINUED | OUTPATIENT
Start: 2021-08-02 | End: 2021-08-10 | Stop reason: HOSPADM

## 2021-08-02 RX ORDER — IBUPROFEN 200 MG
1 TABLET ORAL
Status: DISCONTINUED | OUTPATIENT
Start: 2021-08-02 | End: 2021-08-03

## 2021-08-02 RX ORDER — TRAZODONE HYDROCHLORIDE 50 MG/1
50 TABLET ORAL
Status: DISCONTINUED | OUTPATIENT
Start: 2021-08-02 | End: 2021-08-06

## 2021-08-02 RX ORDER — HYDROXYZINE PAMOATE 50 MG/1
50 CAPSULE ORAL
Status: DISCONTINUED | OUTPATIENT
Start: 2021-08-02 | End: 2021-08-10 | Stop reason: HOSPADM

## 2021-08-02 RX ORDER — ADHESIVE BANDAGE
30 BANDAGE TOPICAL DAILY PRN
Status: DISCONTINUED | OUTPATIENT
Start: 2021-08-02 | End: 2021-08-10 | Stop reason: HOSPADM

## 2021-08-02 RX ADMIN — SODIUM CHLORIDE 1000 ML: 9 INJECTION, SOLUTION INTRAVENOUS at 03:50

## 2021-08-02 NOTE — ED TRIAGE NOTES
EMS: Drug overdose on Buspar 10 mg tablets. Report she took a handful but is unsure of exactly how much. Bottle filled in Feb 2021. With 30 total. Two left in bottle.  Pt presentation normal. SI attempt

## 2021-08-02 NOTE — BH NOTES
[facility] 60  Master Treatment Plan for Ceron Labrum    Date Treatment Plan Initiated: 08/02/2021    Treatment Plan Modalities:  Type of Modality Amount  (x minutes) Frequency (x/week) Duration (x days) Name of Responsible Staff   [address] [place] [address] meetings to encourage peer interactions 30 14 1 Tera Prado., CNA/MHT  Parviz Vargas., MHT   Group psychotherapy to assist in building coping skills and internal controls 60 5 1 Diane Evans., JOHNNYW  Bj George., [facility]   Therapeutic activity groups to build coping skills 61 7 1 Colby Johnson., T     Psychoeducation in group setting to address:   Medication education  Coping Skills  Symptom Management   60 7 1 Diane Mediate., JOHNNYW  [facility]., [facility]  JOYCE Forrester RN   Discharge planning   60 2 1 Johana Benjamin.,    Spirituality    60 2 1 Rosalia Dash.   Physician medication management   15 7 1 MD JOHN Burnham. Angelo Piper MD                                       Treatment Team Signatures    I have participated in the development of this plan of treatment and agree to its implementation.     Patient Signature       Patient Printed Name Date/Time   Social Work/Therapist Signature       Social Work/Therapist Printed Name Date/Time   RN Signature       RN Printed Name Date/Time   Other Signature     Other Signature Date/Time   MD Signature       MD Printed Name Date/Time

## 2021-08-02 NOTE — ROUTINE PROCESS
Shift change report given to Soraida Ferro re: CARRIE. Medications, and behavior.   Alexis Fall Risk Score (1)

## 2021-08-02 NOTE — ED TRIAGE NOTES
Pt arrives via EMS presenting with SI and overdose. Per EMS, pt took a \"handful\" of 10mg buspirone 2 hours pta that were filled in February of 2021 for 30 day prescription. The pill bottle currently has 2 pills only. Pt does not know how many pills were in the bottle before taking them tonight. Pt reports she is depressed x \"a day or two\" and that she is still feeling suicidal. Pt endorses HI towards her \"ex friend\" and daughter and states, \"when I see them again I'm gonna knock them upside the head, that's how I feel! \" Pt will not state why. Pt endorses AVH of people walking past/near her and states it has been going on \"for a while. \" Pt reports drinking two 12oz beers and smoking crack around 10pm tonight. Pt is alert and oriented x 4. RR even and unlabored. Pt does not appear to be in any distress at this time. Will continue to monitor. Emergency Department Nursing Plan of Care       The Nursing Plan of Care is developed from the Nursing assessment and Emergency Department Attending provider initial evaluation. The plan of care may be reviewed in the ED Provider note.     The Plan of Care was developed with the following considerations:   Patient / Family readiness to learn indicated by:verbalized understanding  Persons(s) to be included in education: patient  Barriers to Learning/Limitations:No    Signed     Brenda Cuevas RN    8/2/2021   3:21 AM

## 2021-08-02 NOTE — BH NOTES
Shift [facility] [place] re: SBAR, medications, and behavior.   Alexis fall risk score: 1

## 2021-08-02 NOTE — ED NOTES
Pt is in a green gown and belongings have been checked. Pt has one bag of belongings labeled and stored away in pt belonging cabinet containing her shirt, shorts, and purse.

## 2021-08-02 NOTE — ED NOTES
Per [facility], poison control was contacted at this time and gave the following recommendations: monitor pt temperature, give pt benzos if needed, and observe pt til she is back to baseline. Poison control states that pt might get more anxious.

## 2021-08-02 NOTE — ED PROVIDER NOTES
EMERGENCY DEPARTMENT HISTORY AND PHYSICAL EXAM      Please note that this dictation was completed with the assistance of \"Dragon\", the computer voice recognition software. Quite often unanticipated grammatical, syntax, homophones, and other interpretive errors are inadvertently transcribed by the computer software. Please disregard these errors and any errors that have escaped final proofreading. Thank you. Patient Name: Navin Cancino  : 1970  MRN: [id number]  History of Presenting Illness     Chief Complaint   Patient presents with    Drug Overdose    Mental Health Problem     History Provided By: Patient and EMS    HPI: Navin Cancino, 46 y.o. female with past medical history as documented below presents to the ED with c/o of intentional OD. Pt arrives via EMS presenting with SI and overdose. Per EMS, pt took a \"handful\" of 10mg buspirone 2 hours pta that were filled in 2021 for 30 day prescription. The pill bottle currently has 2 pills only. Pt does not know how many pills were in the bottle before taking them tonight. Pt reports chronic depression. Still endorsing SI. Pt also notes having HI towards an \"ex friend. \" Pt admits to alcohol ad cocaine use tonight. Pt denies any other exacerbating or ameliorating factors. Additionally, pt specifically denies any recent fever, chills, headache, nausea, vomiting, abdominal pain, CP, SOB, lightheadedness, dizziness, numbness, weakness, lower extremity swelling, heart palpitations, urinary sxs, diarrhea, constipation, melena, hematochezia, cough, or congestion. History limited due to acuity of condition and psych condition. There are no other complaints, changes or physical findings pertinent to the HPI at this time.     PCP: Kishor Zhao NP    Past History   Past Medical History:  Past Medical History:   Diagnosis Date    Substance abuse [facility])        Past Surgical History:  Past Surgical History:   Procedure Laterality Date    HX  SECTION      HX TUBAL LIGATION         Family History:  Family History   Problem Relation Age of Onset    Hypertension Sister     Diabetes Sister     Hypertension Brother     Diabetes Brother     Hypertension Mother     Hypertension Father        Social History:  Social History     Tobacco Use    Smoking status: Current Every Day Smoker     Packs/day: 2.00     Years: 15.00     Pack years: 30.00     Types: Cigarettes    Smokeless tobacco: Never Used   Substance Use Topics    Alcohol use: Yes     Alcohol/week: 0.8 standard drinks     Types: 1 Standard drinks or equivalent per week    Drug use: Yes     Types: Cocaine     Comment: Used crack cocaine 10pm        Allergies: Allergies   Allergen Reactions    Vistaril [Hydroxyzine Pamoate] Hives       Current Medications:  No current facility-administered medications on file prior to encounter. Current Outpatient Medications on File Prior to Encounter   Medication Sig Dispense Refill    fluticasone propionate (FLOVENT HFA) 110 mcg/actuation inhaler Take 2 Puffs by inhalation every twelve (12) hours. (Patient not taking: Reported on 2021) 1 Inhaler 11    predniSONE (DELTASONE) 20 mg tablet Take 20 mg by mouth daily (with breakfast). (Patient not taking: Reported on 2021) 5 Tab 0    FLUoxetine (PROZAC) 20 mg capsule take 1 capsule by mouth every morning (Patient not taking: Reported on 2021)  0    traZODone (DESYREL) 50 mg tablet take 1 tablet by mouth at bedtime if needed (Patient not taking: Reported on 2021)  0    albuterol (PROVENTIL HFA, VENTOLIN HFA, PROAIR HFA) 90 mcg/actuation inhaler Take 2 Puffs by inhalation every four (4) hours as needed for Wheezing or Shortness of Breath (persistent coughing). (Patient not taking: Reported on 2021) 1 Inhaler 0    QUEtiapine (SEROQUEL) 100 mg tablet Take 1 Tab by mouth two (2) times a day.  Indications: DEPRESSION ASSOCIATED WITH BIPOLAR DISORDER (Patient not taking: Reported on 8/2/2021) 60 Tab 0     Review of Systems   A complete ROS was reviewed by me today and was negative, unless otherwise specified below:  Review of Systems   Constitutional: Negative. Negative for chills and fever. HENT: Negative. Negative for congestion and sore throat. Eyes: Negative. Respiratory: Negative. Negative for cough, chest tightness, shortness of breath and wheezing. Cardiovascular: Negative. Negative for chest pain, palpitations and leg swelling. Gastrointestinal: Negative. Negative for abdominal distention, abdominal pain, blood in stool, constipation, diarrhea, nausea and vomiting. Endocrine: Negative. Genitourinary: Negative. Negative for dysuria, flank pain, frequency, hematuria and urgency. Musculoskeletal: Negative. Negative for arthralgias, back pain and myalgias. Skin: Negative. Negative for color change and rash. Neurological: Negative. Negative for dizziness, syncope, speech difficulty, weakness, light-headedness, numbness and headaches. Hematological: Negative. Psychiatric/Behavioral: Positive for agitation, behavioral problems, dysphoric mood, self-injury, sleep disturbance and suicidal ideas. Negative for confusion. The patient is not nervous/anxious. All other systems reviewed and are negative. Physical Exam   Physical Exam  Vitals and nursing note reviewed. Constitutional:       General: She is in acute distress. Appearance: She is well-developed. She is ill-appearing, toxic-appearing and diaphoretic. HENT:      Head: Normocephalic and atraumatic. Mouth/Throat:      Pharynx: No oropharyngeal exudate. Eyes:      Conjunctiva/sclera: Conjunctivae normal.   Cardiovascular:      Rate and Rhythm: Normal rate and regular rhythm. Heart sounds: Normal heart sounds. Pulmonary:      Effort: Pulmonary effort is normal. No respiratory distress. Breath sounds: Normal breath sounds. No wheezing or rales.    Chest: Chest wall: No tenderness. Abdominal:      General: Bowel sounds are normal. There is no distension. Palpations: Abdomen is soft. There is no mass. Tenderness: There is no abdominal tenderness. There is no guarding or rebound. Musculoskeletal:         General: Normal range of motion. Cervical back: Normal range of motion. Skin:     General: Skin is warm. Neurological:      Mental Status: She is alert and oriented to person, place, and time. Cranial Nerves: No cranial nerve deficit. Motor: No abnormal muscle tone. Psychiatric:         Mood and Affect: Affect is labile. Behavior: Behavior is slowed and withdrawn. Thought Content: Thought content is not paranoid or delusional. Thought content includes suicidal ideation. Thought content does not include homicidal ideation. Thought content includes suicidal plan. Thought content does not include homicidal plan. Diagnostic Study Results     Labs -   I have personally reviewed and interpreted all available laboratory results.    Recent Results (from the past 24 hour(s))   EKG, 12 LEAD, INITIAL    Collection Time: 08/02/21  3:24 AM   Result Value Ref Range    Ventricular Rate 61 BPM    Atrial Rate 61 BPM    P-R Interval 174 ms    QRS Duration 84 ms    Q-T Interval 438 ms    QTC Calculation (Bezet) 440 ms    Calculated P Axis 51 degrees    Calculated R Axis 45 degrees    Calculated T Axis 42 degrees    Diagnosis       Normal sinus rhythm  Nonspecific T wave abnormality  Abnormal ECG  No previous ECGs available     CBC WITH AUTOMATED DIFF    Collection Time: 08/02/21  3:31 AM   Result Value Ref Range    WBC 3.9 3.6 - 11.0 K/uL    RBC 4.48 3.80 - 5.20 M/uL    HGB 10.9 (L) 11.5 - 16.0 g/dL    HCT 36.9 35.0 - 47.0 %    MCV 82.4 80.0 - 99.0 FL    MCH 24.3 (L) 26.0 - 34.0 PG    MCHC 29.5 (L) 30.0 - 36.5 g/dL    RDW 21.1 (H) 11.5 - 14.5 %    PLATELET 279 844 - 218 K/uL    MPV 10.7 8.9 - 12.9 FL    NRBC 0.0 0  WBC ABSOLUTE NRBC 0.00 0.00 - 0.01 K/uL    NEUTROPHILS 44 32 - 75 %    LYMPHOCYTES 43 12 - 49 %    MONOCYTES 10 5 - 13 %    EOSINOPHILS 2 0 - 7 %    BASOPHILS 1 0 - 1 %    IMMATURE GRANULOCYTES 0 %    ABS. NEUTROPHILS 1.7 (L) 1.8 - 8.0 K/UL    ABS. LYMPHOCYTES 1.7 0.8 - 3.5 K/UL    ABS. MONOCYTES 0.4 0.0 - 1.0 K/UL    ABS. EOSINOPHILS 0.1 0.0 - 0.4 K/UL    ABS. BASOPHILS 0.0 0.0 - 0.1 K/UL    ABS. IMM. GRANS. 0.0 K/UL    DF MANUAL      RBC COMMENTS ANISOCYTOSIS  2+       METABOLIC PANEL, COMPREHENSIVE    Collection Time: 08/02/21  3:31 AM   Result Value Ref Range    Sodium 144 136 - 145 mmol/L    Potassium 3.4 (L) 3.5 - 5.1 mmol/L    Chloride 106 97 - 108 mmol/L    CO2 29 21 - 32 mmol/L    Anion gap 9 5 - 15 mmol/L    Glucose 80 65 - 100 mg/dL    BUN 9 6 - 20 MG/DL    Creatinine 0.76 0.55 - 1.02 MG/DL    BUN/Creatinine ratio 12 12 - 20      GFR est AA >60 >60 ml/min/1.73m2    GFR est non-AA >60 >60 ml/min/1.73m2    Calcium 8.8 8.5 - 10.1 MG/DL    Bilirubin, total 0.3 0.2 - 1.0 MG/DL    ALT (SGPT) 30 12 - 78 U/L    AST (SGOT) 34 15 - 37 U/L    Alk. phosphatase 74 45 - 117 U/L    Protein, total 7.8 6.4 - 8.2 g/dL    Albumin 3.6 3.5 - 5.0 g/dL    Globulin 4.2 (H) 2.0 - 4.0 g/dL    A-G Ratio 0.9 (L) 1.1 - 2.2     ETHYL ALCOHOL    Collection Time: 08/02/21  3:31 AM   Result Value Ref Range    ALCOHOL(ETHYL),SERUM 48 (H) <98 MG/DL   SALICYLATE    Collection Time: 08/02/21  3:31 AM   Result Value Ref Range    Salicylate level 3.8 2.8 - 20.0 MG/DL   CK W/ REFLX CKMB    Collection Time: 08/02/21  3:31 AM   Result Value Ref Range     (H) 26 - 192 U/L   CK-MB,QUANT.     Collection Time: 08/02/21  3:31 AM   Result Value Ref Range    CK - MB 1.1 <3.6 NG/ML    CK-MB Index 0.5 0.0 - 2.5     URINALYSIS W/ REFLEX CULTURE    Collection Time: 08/02/21  3:49 AM    Specimen: Urine   Result Value Ref Range    Color YELLOW/STRAW      Appearance CLEAR CLEAR      Specific gravity 1.020 1.003 - 1.030      pH (UA) 6.0 5.0 - 8.0      Protein Negative NEG mg/dL    Glucose Negative NEG mg/dL    Ketone Negative NEG mg/dL    Bilirubin Negative NEG      Blood Negative NEG      Urobilinogen 1.0 0.2 - 1.0 EU/dL    Nitrites Negative NEG      Leukocyte Esterase Negative NEG      WBC PENDING /hpf    RBC PENDING /hpf    Epithelial cells PENDING /lpf    Bacteria PENDING /hpf    UA:UC IF INDICATED PENDING        Radiologic Studies -   I have personally reviewed and interpreted all available imaging studies and agree with radiology interpretation and report. No orders to display     CT Results  (Last 48 hours)    None        CXR Results  (Last 48 hours)    None          Medical Decision Making   I reviewed the vital signs, available nursing notes, past medical history, past surgical history, family history and social history. Vital Signs-Reviewed the patient's vital signs. Patient Vitals for the past 24 hrs:   Temp Pulse Resp BP SpO2   08/02/21 0338    137/80    08/02/21 0318 98.3 °F (36.8 °C) 71 16 (!) 157/103 98 %       Pulse Oximetry Analysis - 98% on RA    Cardiac Monitor:   Rate: 71 bpm  The cardiac monitor revealed the following rhythm as interpreted by me: Normal Sinus Rhythm     The cardiac monitor was ordered secondary to the patient's history of overdose and to monitor the patient for dysrhythmia    ED EKG interpretation:  Rhythm: normal sinus rhythm; and regular . Rate (approx.): 61; Axis: normal; P wave: normal; QRS interval: normal ; ST/T wave: normal; Other findings: normal. This EKG was interpreted by Juanita Gallagher MD    Records Reviewed: Nursing Notes, Old Medical Records, Previous electrocardiograms, Previous Radiology Studies and Previous Laboratory Studies    Provider Notes (Medical Decision Making):   Patient presents with acute suicidal ideation and overdose on B[place]ar, will contact poison control. DDx:  2/2 MDD, schizoaffective d/o, bipolar, drug induced, organic cause such as electrolyte anomoly or infection.  Stable vitals and benign exam. No obvious organic causes to explain behavior but will obtain psych labs, UA, UDS and speak with mental health professional about possible admission. Pt is currently voluntary. Sitter at bedside. Will continue to monitor while in ED. ED Course:   I am the first physician for this patient's ED visit today. Initial assessment performed. I discussed presenting problems, concerns and my formulated plan for today's visit with the patient and any available family members at bedside. I encouraged them to ask questions as they arise throughout the visit. Social History     Tobacco Use    Smoking status: Current Every Day Smoker     Packs/day: 2.00     Years: 15.00     Pack years: 30.00     Types: Cigarettes    Smokeless tobacco: Never Used   Substance Use Topics    Alcohol use: Yes     Alcohol/week: 0.8 standard drinks     Types: 1 Standard drinks or equivalent per week    Drug use: Yes     Types: Cocaine     Comment: Used crack cocaine 10pm 8/1     TOBACCO COUNSELING:  Upon evaluation, pt expressed that they are a current tobacco user. For approximately 3-5 mins, pt has been counseled on the dangers of smoking and was encouraged to quit as soon as possible in order to decrease further risks to their health. Pt has conveyed their understanding of the risks involved should they continue to use tobacco products. I reviewed our electronic medical record system for any past medical records that were available that may contribute to the patient's current condition, the nursing notes and vital signs from today's visit. ED Orders Placed :  Orders Placed This Encounter    CBC WITH AUTOMATED DIFF    METABOLIC PANEL, COMPREHENSIVE    BLOOD ALCOHOL (Ethyl Alcohol)    SALICYLATE    CK W/REFLEX CKMB    DRUG SCREEN, URINE    URINALYSIS W/ REFLEX CULTURE    COVID-19 WITH INFLUENZA A/B    CK-MB,QUANT.     ACETAMINOPHEN    CONTACT POISON CONTROL    EKG 12 LEAD INITIAL    INSERT PERIPHERAL IV ONE TIME STAT    SUICIDE PRECAUTIONS    sodium chloride 0.9 % bolus infusion 1,000 mL    IP CONSULT TO BSMART       ED Medications Administered:  Medications   sodium chloride 0.9 % bolus infusion 1,000 mL (1,000 mL IntraVENous New Bag 8/2/21 0350)        Progress Note:  Per Poison control, will monitor temp, give benzo's as needed, and observe until pt is back to baseline. Progress Note:  No events on cardiac monitoring, labs and workup reviewed, pt is medically cleared for psych admission. Consult Note:  Lima Busby MD spoke with Lissy Arrington  Specialty: [facility]  Discussed pt's hx, disposition, and available diagnostic and imaging results. Reviewed care plans. Agree with management and plan thus far. Consultant will evaluate pt. CRITICAL CARE NOTE :  IMPENDING DETERIORATION -Airway, Respiratory, Cardiovascular, CNS and Metabolic  ASSOCIATED RISK FACTORS - Hypotension, Shock, Metabolic changes, Dehydration, Vascular Compromise, CNS Decompensation and Drug overdose  MANAGEMENT- Bedside Assessment and Supervision of Care  INTERPRETATION -  ECG, Blood Pressure and Cardiac Output Measures   INTERVENTIONS - hemodynamic mngmt, Neurologic interventions  and Metobolic interventions  CASE REVIEW - Medical Sub-Specialist, Nursing, Family and Poison Control  TREATMENT RESPONSE -Improved  PERFORMED BY - Self    NOTES   :  I personally spent 55 minutes of critical care time with this patient. This is time spent at this critically ill patient's bedside actively involved in patient care as well as the coordination of care and discussions with the patient's family. This includes time involved in lab review, consultations with specialist, family decision-making, bedside attention and documentation. During this entire length of time I was immediately available to the patient. This does not include time spent on separately reported billable procedures. Critical Care:  The reason for providing this level of medical care for this critically-ill patient was due to a critical illness that impaired one or more vital organ systems, such that there was a high probability of imminent or life-threatening deterioration in the patient's condition. This care involved the highest level of preparedness to intervene urgently. This care involved high complexity decision making to assess, manipulate, and support vital system functions, to treat this degree of vital organ system failure, and to prevent further life threatening deterioration of the patients condition requiring frequent assessments and interventions. Ruma Hamilton MD    Disposition:   ADMIT  Given the patient's current clinical presentation, I have a high level of concern for decompensation if discharged from the emergency department. Patient is being admitted to the hospital.  The results of their tests and reasons for their admission have been discussed with them and/or available family. They convey agreement and understanding for the need to be admitted and for their admission diagnosis. Consultation has been made with the inpatient physician specialist for hospitalization. Diagnosis   Clinical Impression:  1. Medication overdose, intentional self-harm, initial encounter ([facility] [address]    2. Suicidal ideation    3. Evaluation by psychiatric service required    4. Hallucinations    5. Homicidal ideations    6. Polysubstance abuse ([address]        Attestation:  Chance Claudio MD, am the attending of record for this patient. I personally performed the services described in this documentation on this date, 8/2/2021 for patient, Jacoby Holder. I have reviewed the chart and verified that the record is accurate and complete.

## 2021-08-02 NOTE — BH NOTES
Admission Note:    Patient arrived on the unit at 1356 on a voluntary admission from [facility] ER. Patient was escorted on the unit by [facility] and 2 EMT's. Dr. August Oconnor and Nursing Supervisor notified of patient's arrival.  Hospitalist Dr. Charli French notified in [address] [place] [address] for a Medical H&P. Patient denied having any license with the [employer]. Patient smokes 1/2 pack of cigarettes per day. Patient was counseled on smoking cessation to include recognizing danger situations, coping skills, and information on quitting. Nicotine patch 21 mg topically order daily PRN to decrease smoking cessation. Patient also alcohol and drugs. Counseled patient on alcohol and drug abuse. Patient verbalized understanding. Treatment Plan Initiated.

## 2021-08-02 NOTE — ROUTINE PROCESS
TRANSFER - OUT REPORT:    Verbal report given to Rena Prakash (name) on Gretchen Do  being transferred to [facility] room 228-2. (unit) for routine progression of care       Report consisted of patients Situation, Background, Assessment and   Recommendations(SBAR). Information from the following report(s) SBAR, ED Summary and Recent Results was reviewed with the receiving nurse. Lines:   Peripheral IV 08/02/21 Left Antecubital (Active)   Site Assessment Clean, dry, & intact 08/02/21 0333   Phlebitis Assessment 0 08/02/21 0333   Infiltration Assessment 0 08/02/21 0333   Dressing Status Clean, dry, & intact 08/02/21 0333   Dressing Type Transparent;Tape 08/02/21 0333   Hub Color/Line Status Pink;Patent; Flushed 08/02/21 0333   Action Taken Blood drawn 08/02/21 0333   Alcohol Cap Used Yes 08/02/21 0679        Opportunity for questions and clarification was provided.       Patient transported with:   EMS

## 2021-08-02 NOTE — BH NOTES
TRANSFER - IN REPORT:    Verbal report received from Rosanna Divers on Bittinger  being received from [facility] ER(unit) for routine progression of care      Report consisted of patients Situation, Background, Assessment and   Recommendations(SBAR). Information from the following report(s) SBAR, Kardex, ED Summary, MAR, Recent Results and Med Rec Status was reviewed with the receiving nurse. Lines:    Opportunity for questions and clarification was provided. Assessment completed upon patients arrival to unit and care assumed. Patient arrived on the unit @ [phone] via stretcher from [facility] [place] ED and escorted by [facility] and 2 EMT's.

## 2021-08-02 NOTE — BSMART NOTE
Comprehensive Assessment Form Part 1      Section I - Disposition    Axis I - Substance Induced Mood Disorder  Unspecified Depressive Disorder  Cocaine Use Disorder, Moderate  Axis II - Deferred  Axis III -   Past Medical History:   Diagnosis Date    Substance abuse [facility])        The Medical Doctor to Psychiatrist conference was not completed. The Medical Doctor is in agreement with Psychiatrist disposition because of (reason) patient meets criteria for inpatient admission and patient is voluntary. The plan is present for admission. The on-call Psychiatrist consulted was Dr. Rogelio Sarabia. The admitting Psychiatrist will be Dr. Aarti Maldonado. The admitting Diagnosis is TBD. The Payor source is [address] [place] [address]. Section II - Integrated Summary  Summary:  Patient is a 46 y.o. ambulatory female presenting to ED  per triage, \"Pt arrives via EMS presenting with SI and overdose. Per EMS, pt took a \"handful\" of 10mg buspirone 2 hours pta that were filled in February of 2021. The pill bottle currently has 2 pills only. Pt does not know how many pills were in the bottle before taking them tonight. Pt reports she is depressed x \"a day or two\" and that she is still feeling suicidal. Pt endorses HI towards her \"ex friend\" and daughter and states, \"when I see them again I'm gonna knock them upside the head, that's how I feel! \" Pt will not state why. Pt endorses AVH of people walking past/near her and states it has been going on \"for a while. \" Pt reports drinking two 12oz beers and smoking crack around 10pm tonight. Pt is alert and oriented x 4. RR even and unlabored. Pt does not appear to be in any distress at this time. Will continue to monitor. \"    Patient is alert and oriented x 4. Patient is cooperative. Patient had to be redirected approximately 5 times throughout the assessment as patient was falling asleep. Patient reported taking several Buspirone pills tonight in order to kill herself. Patient reported current SI at time of assessment. Patient reported, \"I been depressed a long time. \" Patient reported no specific incident that led up to her current attempt. Patient reported she lives with her daughter and pays rent. Patient reported she paid this months rent and kept calling her daughter and her daughter would not let her in the home. Patient reported a history of suicide attempt in December 2020 when she attempted to take all her medications. Patient denied HI but reported sometimes wanting to harm her ex-boyfriend because \"me and him don't get along. \" Patient reported no plan to harm ex-boyfriend. Patient denied current AVH but reported hearing someone calling her and seeing someone walking near her a few hours ago. Patient has a history of several pscyhiatric hospitalizations. Patient reported being admitted to [facility] 4-5 times with her last admission being about 2 years ago. Patient was admitted to [facility] [place] in 2016 according to medical records. Patient denied seeing a psychiatrist and reported she is not taking any medication currently. Patient reported she has a counselor named Maylin Schwarz that \"I call whenever I have a problem. \" Patient reported talking to Maylin yesterday about \"nothing important. \" Patient reported she did not tell her counselor that she was feeling depressed or suicidal.  When asked about previous mental health diagnoses patient stated, \"not that I know of.\"    Patient is voluntary for admission. Patient will be presented for admission. The patienthas demonstrated mental capacity to provide informed consent. The information is given by the patient and past medical records. The Chief Complaint is SI with plan. The Precipitant Factors are substance use. Previous Hospitalizations: Yes-several  The patient has not previously been in restraints. Current Psychiatrist and/or  is Damon.     Lethality Assessment:    The potential for suicide noted by the following: previous history of attempts, current attempt, ideation and current substance abuse. The potential for homicide is not noted. The patient has not been a perpetrator of sexual or physical abuse. There are not pending charges. The patient is felt to be at risk for self harm or harm to others. The attending nurse was advised the patient needs supervision. Section III - Psychosocial  The patient's overall mood and attitude is cooperative and drowsy. Feelings of helplessness and hopelessness are observed by current suicide attempt. Generalized anxiety is not observed. Panic is not observed. Phobias are not observed. Obsessive compulsive tendencies are not observed. Section IV - Mental Status Exam  The patient's appearance is unkempt. The patient's behavior shows poor impulse control. The patient is oriented to time, place, person and situation. The patient's speech is slurred. The patient's mood. The range of affect shows no evidence of impairment. The patient's thought content demonstrates no evidence of impairment. The thought process shows no evidence of impairment. The patient's perception shows no evidence of impairment. The patient's memory shows no evidence of impairment. The patient's appetite shows no evidence of impairment. The patient's sleep shows no evidence of impairment. The patient shows little insight. The patient's judgement is psychologically impaired. Section V - Substance Abuse  The patient is using substances. The patient is using alcohol for greater than 10 years with last use on today and crack cocaine by inhalation for greater than 10 years with last use on today. The patient has experienced the following withdrawal symptoms: N/A. Section VI - Living Arrangements  The patient is single. The patient lives daughter/ex-boyfriend. The patient has 9 children.   The patient does plan to return home upon discharge. The patient does not have legal issues pending. The patient's source of income comes from disability. Quaker and cultural practices have not been voiced at this time. The patient's greatest support comes from son and this person will not be involved with the treatment. The patient has not been in an event described as horrible or outside the realm of ordinary life experience either currently or in the past.  The patient has not been a victim of sexual/physical abuse. Section VII - Other Areas of Clinical Concern  The highest grade achieved is 9th with the overall quality of school experience being described as not noted. The patient is currently disabled and speaks [place] as a primary language. The patient has no communication impairments affecting communication. The patient's preference for learning can be described as: can read and write adequately.   The patient's hearing is normal.  The patient's vision is normal.      NASIR Reyes

## 2021-08-02 NOTE — BSMART NOTE
Patient accepted to [facility], Room 228-2. Accepting is Dr. Rafael Rome. Nursing report x [phone].

## 2021-08-02 NOTE — H&P
[facility]   Admission History & Physical        2021 3:56 PM  Patient: Solange Alfaro 1970  PCP: Cristhian Mora NP    HISTORY  Chief Complaint: No chief complaint on file. HPI: 46 y.o. female presenting for admission to [facility] for further evaluation and treatment for suicidal ideation and overdose. She  has a past medical history of Substance abuse ([address] . She reports the presenting symptoms included overdose and suicidal ideation. Patient was brought via EMS. Patient had a handful of 10 mg risperidone 2 hours PTA that were feeling 2021 for 30-day prescription. Patient does not know how many bottle filled have taken tonight. Patient repetitively to be depressed for the past 2 days and still think she feel suicidal.  Patient used to live with the daughter but has been asked to move out. Pt endorses HI towards her \"ex friend\" and daughter and states, \"when I see them again I'm gonna knock them upside the head, that's how I feel! \" Pt will not state why. Pt endorses AVH of people walking past/near her and states it has been going on \"for a while. \" Pt reports drinking two 12oz beers and smoking crack around 10pm tonight. Patient denied of any fever, chills, shortness of breath, cough, abdominal pain, chest pain, urinary symptoms or changes in bowel habit. Past Medical History:  Past Medical History:   Diagnosis Date    Substance abuse [facility])        Past Surgical History:  Past Surgical History:   Procedure Laterality Date    HX  SECTION      HX TUBAL LIGATION         Medication:  Prior to Admission medications    Medication Sig Start Date End Date Taking? Authorizing Provider   fluticasone propionate (FLOVENT HFA) 110 mcg/actuation inhaler Take 2 Puffs by inhalation every twelve (12) hours.   Patient not taking: Reported on 2021   Cristhian Mora NP   predniSONE (DELTASONE) 20 mg tablet Take 20 mg by mouth daily (with breakfast). Patient not taking: Reported on 8/2/2021 4/12/19   Chan Callahan NP   FLUoxetine (PROZAC) 20 mg capsule take 1 capsule by mouth every morning  Patient not taking: Reported on 8/2/2021 3/18/19   Provider, Historical   traZODone (DESYREL) 50 mg tablet take 1 tablet by mouth at bedtime if needed  Patient not taking: Reported on 8/2/2021 3/18/19   Provider, Historical   albuterol (PROVENTIL HFA, VENTOLIN HFA, PROAIR HFA) 90 mcg/actuation inhaler Take 2 Puffs by inhalation every four (4) hours as needed for Wheezing or Shortness of Breath (persistent coughing). Patient not taking: Reported on 8/2/2021 3/28/19   Chan Callahan NP   QUEtiapine (SEROQUEL) 100 mg tablet Take 1 Tab by mouth two (2) times a day. Indications: DEPRESSION ASSOCIATED WITH BIPOLAR DISORDER  Patient not taking: Reported on 8/2/2021 10/6/16   Belen Quijano MD       Allergies:  No Active Allergies    Social History:  Social History     Tobacco Use    Smoking status: Current Every Day Smoker     Packs/day: 2.00     Years: 15.00     Pack years: 30.00     Types: Cigarettes    Smokeless tobacco: Never Used   Substance Use Topics    Alcohol use:  Yes     Alcohol/week: 0.8 standard drinks     Types: 1 Standard drinks or equivalent per week    Drug use: Yes     Types: Cocaine     Comment: Used crack cocaine 10pm 8/1       Family History:  Family History   Problem Relation Age of Onset    Hypertension Sister     Diabetes Sister     Hypertension Brother     Diabetes Brother     Hypertension Mother     Hypertension Father        ROS:      Total of 12 systems reviewed as follows:  POSITIVE= bolded text  Negative = text not bolded       General:  fever, chills, sweats, generalized weakness, weight loss/gain, loss of appetite   Eyes:    blurred vision, eye pain, loss of vision, double vision  ENT:    rhinorrhea, pharyngitis   Respiratory:  cough, sputum production, SOB, VILLARREAL, wheezing, pleuritic pain   Cardiology:   chest pain, palpitations, orthopnea, PND, edema, syncope   Gastrointestinal:  abdominal pain , N/V, diarrhea, dysphagia, constipation, bleeding   Genitourinary:  frequency, urgency, dysuria, hematuria, incontinence, prostatism   Muskuloskeletal: arthralgia, myalgia, back pain  Hematology:   easy bruising, nose or gum bleeding, lymphadenopathy   Dermatological: rash, ulceration, pruritis, color change / jaundice  Endocrine:   hot flashes or polydipsia   Neurological:  headache, dizziness, confusion, focal weakness, paresthesia, speech difficulties, memory loss, gait difficulty  Psychological: feelings of anxiety, depression, agitation      PHYSICAL EXAM:  Patient Vitals for the past 24 hrs:   Temp Pulse Resp BP SpO2   08/02/21 1528 98.2 °F (36.8 °C) 70 17 (!) 141/87 95 %   08/02/21 1356 98.2 °F (36.8 °C) 66 16 133/85 94 %       General:    Alert, cooperative, no distress, appears stated age. HEENT: Atraumatic, anicteric sclerae, pink conjunctivae     No oral ulcers, mucosa moist, throat clear, dentition fair  Neck:  Supple, symmetrical;   thyroid non tender  Lungs:   Clear to auscultation bilaterally. No wheezing or rhonchi. No rales. Chest wall:  No tenderness. No accessory muscle use. Heart:   Regular rhythm. No  murmur. No edema  Abdomen:   Soft, non-tender. Not distended. Bowel sounds normal  Extremities: No cyanosis. No clubbing      Capillary refill normal,  Radial pulse 2+,   Skin:     Not pale. Not jaundiced. No rashes   Psych:  Good insight. depressed. anxious or agitated. Neurologic: EOMs intact. No facial asymmetry. No aphasia or slurred speech. Symmetrical strength, Sensation grossly intact. Alert and oriented X 4.     Lab Data Reviewed:    Recent Results (from the past 24 hour(s))   EKG, 12 LEAD, INITIAL    Collection Time: 08/02/21  3:24 AM   Result Value Ref Range    Ventricular Rate 61 BPM    Atrial Rate 61 BPM    P-R Interval 174 ms    QRS Duration 84 ms    Q-T Interval 438 ms    QTC Calculation (Bezet) 440 ms    Calculated P Axis 51 degrees    Calculated R Axis 45 degrees    Calculated T Axis 42 degrees    Diagnosis       Normal sinus rhythm  Nonspecific T wave abnormality  Abnormal ECG  No previous ECGs available  Confirmed by Grayson De Luna M.D., Tiny Christensen ([id number]) on 8/2/2021 8:04:05 AM     CBC WITH AUTOMATED DIFF    Collection Time: 08/02/21  3:31 AM   Result Value Ref Range    WBC 3.9 3.6 - 11.0 K/uL    RBC 4.48 3.80 - 5.20 M/uL    HGB 10.9 (L) 11.5 - 16.0 g/dL    HCT 36.9 35.0 - 47.0 %    MCV 82.4 80.0 - 99.0 FL    MCH 24.3 (L) 26.0 - 34.0 PG    MCHC 29.5 (L) 30.0 - 36.5 g/dL    RDW 21.1 (H) 11.5 - 14.5 %    PLATELET 158 443 - 122 K/uL    MPV 10.7 8.9 - 12.9 FL    NRBC 0.0 0  WBC    ABSOLUTE NRBC 0.00 0.00 - 0.01 K/uL    NEUTROPHILS 44 32 - 75 %    LYMPHOCYTES 43 12 - 49 %    MONOCYTES 10 5 - 13 %    EOSINOPHILS 2 0 - 7 %    BASOPHILS 1 0 - 1 %    IMMATURE GRANULOCYTES 0 %    ABS. NEUTROPHILS 1.7 (L) 1.8 - 8.0 K/UL    ABS. LYMPHOCYTES 1.7 0.8 - 3.5 K/UL    ABS. MONOCYTES 0.4 0.0 - 1.0 K/UL    ABS. EOSINOPHILS 0.1 0.0 - 0.4 K/UL    ABS. BASOPHILS 0.0 0.0 - 0.1 K/UL    ABS. IMM. GRANS. 0.0 K/UL    DF MANUAL      RBC COMMENTS ANISOCYTOSIS  2+       METABOLIC PANEL, COMPREHENSIVE    Collection Time: 08/02/21  3:31 AM   Result Value Ref Range    Sodium 144 136 - 145 mmol/L    Potassium 3.4 (L) 3.5 - 5.1 mmol/L    Chloride 106 97 - 108 mmol/L    CO2 29 21 - 32 mmol/L    Anion gap 9 5 - 15 mmol/L    Glucose 80 65 - 100 mg/dL    BUN 9 6 - 20 MG/DL    Creatinine 0.76 0.55 - 1.02 MG/DL    BUN/Creatinine ratio 12 12 - 20      GFR est AA >60 >60 ml/min/1.73m2    GFR est non-AA >60 >60 ml/min/1.73m2    Calcium 8.8 8.5 - 10.1 MG/DL    Bilirubin, total 0.3 0.2 - 1.0 MG/DL    ALT (SGPT) 30 12 - 78 U/L    AST (SGOT) 34 15 - 37 U/L    Alk.  phosphatase 74 45 - 117 U/L    Protein, total 7.8 6.4 - 8.2 g/dL    Albumin 3.6 3.5 - 5.0 g/dL    Globulin 4.2 (H) 2.0 - 4.0 g/dL    A-G Ratio 0.9 (L) 1.1 - 2.2     ETHYL ALCOHOL    Collection Time: 08/02/21  3:31 AM   Result Value Ref Range    ALCOHOL(ETHYL),SERUM 48 (H) <03 MG/DL   SALICYLATE    Collection Time: 08/02/21  3:31 AM   Result Value Ref Range    Salicylate level 3.8 2.8 - 20.0 MG/DL   CK W/ REFLX CKMB    Collection Time: 08/02/21  3:31 AM   Result Value Ref Range     (H) 26 - 192 U/L   CK-MB,QUANT. Collection Time: 08/02/21  3:31 AM   Result Value Ref Range    CK - MB 1.1 <3.6 NG/ML    CK-MB Index 0.5 0.0 - 2.5     ACETAMINOPHEN    Collection Time: 08/02/21  3:31 AM   Result Value Ref Range    Acetaminophen level <2 (L) 10 - 30 ug/mL   DRUG SCREEN, URINE    Collection Time: 08/02/21  3:49 AM   Result Value Ref Range    AMPHETAMINES Negative NEG      BARBITURATES Negative NEG      BENZODIAZEPINES Negative NEG      COCAINE Positive (A) NEG      METHADONE Negative NEG      OPIATES Negative NEG      PCP(PHENCYCLIDINE) Negative NEG      THC (TH-CANNABINOL) Negative NEG      Drug screen comment (NOTE)    URINALYSIS W/ REFLEX CULTURE    Collection Time: 08/02/21  3:49 AM    Specimen: Urine   Result Value Ref Range    Color YELLOW/STRAW      Appearance CLEAR CLEAR      Specific gravity 1.020 1.003 - 1.030      pH (UA) 6.0 5.0 - 8.0      Protein Negative NEG mg/dL    Glucose Negative NEG mg/dL    Ketone Negative NEG mg/dL    Bilirubin Negative NEG      Blood Negative NEG      Urobilinogen 1.0 0.2 - 1.0 EU/dL    Nitrites Negative NEG      Leukocyte Esterase Negative NEG      WBC 0-4 0 - 4 /hpf    RBC 0-5 0 - 5 /hpf    Epithelial cells FEW FEW /lpf    Bacteria Negative NEG /hpf    UA:UC IF INDICATED CULTURE NOT INDICATED BY UA RESULT CNI     COVID-19 WITH INFLUENZA A/B    Collection Time: 08/02/21  3:49 AM   Result Value Ref Range    SARS-CoV-2 Not detected NOTD      Influenza A by PCR Not detected      Influenza B by PCR Not detected         EKG: normal EKG, normal sinus rhythm, unchanged from previous tracings.      Radiology:  No orders to display       Care Plan discussed with:   Patient x    Family     RN x         Consultant      Expected  Disposition:   Home with Family x   HH/PT/OT/RN    SNF/LTC    MERYL      TOTAL TIME:  60 Minutes      Comments    x Reviewed previous records   >50% of visit spent in counseling and coordination of care x Discussion with patient and/or family and questions answered       _______________________________________________________  Given the patient's current clinical presentation, I have a high level of concern for decompensation if discharged from the emergency department. Complex decision making was performed, which includes reviewing the patient's available past medical records, laboratory results, and x-ray films. My assessment of this patient's clinical condition and my plan of care is as follows. ASSESSMENT / PLAN    Active Problems:    Substance induced mood disorder ([facility] (10/4/2016)      Major depressive disorder, recurrent, unspecified ([facility] (8/2/2021)      Depression (8/2/2021)      80-year-old female with history of substance abuse/mood disorder who presented via EMS for suicidal ideation and severe depression.    -Admit inpatient to psychiatric unit.  -Suicidal safety precaution. Psychiatry consult. Urine tox positive for cocaine.  -Patient does not have any medical problems/not taking any medication. Labs within normal limit.  -Anxiety/anger management. Group therapy. Depression medication per psychiatry recommendation.  -Will need placement at the time of discharge. Has remained homeless.     SAFETY:   Code Status:Full  DVT prophylaxis:No VTE Prophylaxis Needed  Stress Ulcer prophylaxis: Not Indicated  Bladder catheter:no  Family Contact Info:  Primary Emergency ContactSNéstor Persaud Phone: [phone]  Bedded: [facility] Room 228/02  Disposition: TBD, likely home when stable  Admission status:  Inpatient    -Tentative plan of care discussed with patient / family, who demonstrated understanding and is in agreement to the above  -Case was reviewed with the ED Provider, MD Cornell Velasco Head, MD  [facility] Hospitalist  [phone]

## 2021-08-02 NOTE — PROGRESS NOTES
Problem: Suicide  Goal: *STG: Remains safe in hospital  Outcome: Progressing Towards Goal     Problem: Depressed Mood (Adult/Pediatric)  Goal: *STG: Remains safe in hospital  Outcome: Progressing Towards Goal  Goal: *STG: Complies with medication therapy  Outcome: Progressing Towards Goal

## 2021-08-02 NOTE — ED NOTES
Pt currently in route to [facility] via [facility] in no acute distress.

## 2021-08-03 LAB
CHOLEST SERPL-MCNC: 131 MG/DL
COMMENT, HOLDF: NORMAL
HDLC SERPL-MCNC: 55 MG/DL
HDLC SERPL: 2.4 {RATIO} (ref 0–5)
LDLC SERPL CALC-MCNC: 68.2 MG/DL (ref 0–100)
SAMPLES BEING HELD,HOLD: NORMAL
TRIGL SERPL-MCNC: 39 MG/DL (ref ?–150)
TSH SERPL DL<=0.05 MIU/L-ACNC: 2.18 UIU/ML (ref 0.36–3.74)
VLDLC SERPL CALC-MCNC: 7.8 MG/DL

## 2021-08-03 PROCEDURE — 84443 ASSAY THYROID STIM HORMONE: CPT

## 2021-08-03 PROCEDURE — 65220000003 HC RM SEMIPRIVATE PSYCH

## 2021-08-03 PROCEDURE — 74011250637 HC RX REV CODE- 250/637: Performed by: PSYCHIATRY & NEUROLOGY

## 2021-08-03 PROCEDURE — 80061 LIPID PANEL: CPT

## 2021-08-03 PROCEDURE — 36415 COLL VENOUS BLD VENIPUNCTURE: CPT

## 2021-08-03 PROCEDURE — 90791 PSYCH DIAGNOSTIC EVALUATION: CPT | Performed by: PSYCHIATRY & NEUROLOGY

## 2021-08-03 RX ORDER — DM/P-EPHED/ACETAMINOPH/DOXYLAM 30-7.5/3
2 LIQUID (ML) ORAL
Status: DISCONTINUED | OUTPATIENT
Start: 2021-08-03 | End: 2021-08-10 | Stop reason: HOSPADM

## 2021-08-03 RX ORDER — OXCARBAZEPINE 300 MG/1
300 TABLET, FILM COATED ORAL 2 TIMES DAILY
Status: DISCONTINUED | OUTPATIENT
Start: 2021-08-03 | End: 2021-08-04

## 2021-08-03 RX ORDER — OXCARBAZEPINE 150 MG/1
150 TABLET, FILM COATED ORAL
Status: COMPLETED | OUTPATIENT
Start: 2021-08-03 | End: 2021-08-03

## 2021-08-03 RX ADMIN — OXCARBAZEPINE 300 MG: 300 TABLET, FILM COATED ORAL at 17:43

## 2021-08-03 RX ADMIN — OXCARBAZEPINE 150 MG: 150 TABLET, FILM COATED ORAL at 09:47

## 2021-08-03 NOTE — ROUTINE PROCESS
Shift change report given to Laurent Garcia Rn, re: SBAR. Medications, and behavior.   Alexis Fall Risk Score (1)

## 2021-08-03 NOTE — PROGRESS NOTES
Problem: Falls - Risk of  Goal: *Absence of Falls  Description: Document Hali Pillow Fall Risk and appropriate interventions in the flowsheet.   Outcome: Progressing Towards Goal  Note: Fall Risk Interventions:            Medication Interventions: Teach patient to arise slowly                   Problem: Patient Education: Go to Patient Education Activity  Goal: Patient/Family Education  Outcome: Progressing Towards Goal     Problem: Suicide  Goal: *STG: Remains safe in hospital  Outcome: Progressing Towards Goal

## 2021-08-03 NOTE — GROUP NOTE
[facility]  GROUP DOCUMENTATION INDIVIDUAL                                                                          Group Therapy Note    Date: 8/3/2021    Group Start Time: 1500  Group End Time: 6659  Group Topic: Nursing    Neelam [id number], RN    IP [address] [facility] GROUP DOCUMENTATION GROUP    Group Therapy Note    Attendees: 4         Attendance: Attended    Patient's Goal:  Patient will be compliant with medication education regimen daily. Interventions/techniques: Provide feedback    Follows Directions: Followed directions    Interactions: Interacted appropriately    Mental Status: Blunted and Depressed    Behavior/appearance: Attentive, Cooperative and Needed prompting    Goals Achieved: Able to engage in interactions, Able to give feedback to another and Able to receive feedback      Additional Notes:  Patient attended and engaged in today's Nursing Medication Education Group with prompting. Patient provided written handouts.     Addie Flores RN

## 2021-08-03 NOTE — GROUP NOTE
[facility]  GROUP DOCUMENTATION INDIVIDUAL                                                                          Group Therapy Note    Date: 8/3/2021    Group Start Time: 1300  Group End Time: 1350  Group Topic: Process Group - Inpatient    [address], [address] [facility] GROUP DOCUMENTATION GROUP    Group Therapy Note    Attendees: Focus of session was on developing simple and practical action plans to start solving the problems that we are dealing with right now. We spoke about how we need to develop the attitude and belief of moving from being a patient to whom things are done to being a person who does things. We discussed a simple action plan of I want to _______ and then focus on the questions of who can help me and how can they help me, how will I know when I have achieved it, what are my next best steps, why is it important to me, when do I want to achieve it, and what resources do I have? We spoke about having the plan be specific, time-framed, achievable, and reviewable in order to be useful. Attendance: Attended    Patient's Goal:       Demonstrates reduction in symptoms and increase in insight into coping skills/future focused             Interventions/techniques: Informed, Validated, Promoted peer support and Supported    Follows Directions: Followed directions    Interactions: Interacted appropriately    Mental Status: Calm, Congruent, Labile and Preoccupied    Behavior/appearance: Attentive, Cooperative, Needed prompting and Negative    Goals Achieved: Able to engage in interactions, Able to listen to others, Able to self-disclose, Discussed coping, Discussed discharge plans, Identified feelings, Identified triggers and Identified relapse prevention strategies      Additional Notes: Carroll Velazquez participated in group with prompting. She spoke about how she knows she has an \"anger problem\" and that she has been struggling with finding ways to manage it.   She did talk about how she does want to get a handle on it and she spoke about the anger she can have towards her ex boyfriend. We spoke about coping strategies particularly for anger.       Manfred Garcia

## 2021-08-03 NOTE — H&P
[facility] HISTORY AND PHYSICAL    Name:  Laurita Trotter  MR#:  [id number]  :  1970  ACCOUNT #:  [de-identified]  ADMIT DATE:  2021    [facility] AD[facility]    HISTORY OF PRESENT ILLNESS:  The patient is a 54-year-old single  female who has a lengthy past psychiatric history of depressive episodes, as well as a likely history of significant cocaine dependence. She was admitted voluntarily from the [facility] emergency room after presenting there with some suicidal thoughts and having taken a small overdose of a \"handful\" of BuSpar pills. She states she had been feeling depressed for only 1-2 days, but she also was feeling angry, stating that her daughter and \"ex\" friend had taken advantage of her. She indicated she had not been on any medication for sometime, either. Apparently, she was living with her daughter and she tells me she is paying rent, but the daughter \"put me out\" recently and took some or most of her money. It may be the case that she got her SSI check on  and used much/most of it on the cocaine, as she admits to smoking crack cocaine recently. It is also likely the case that \"emotional crash\" from the cocaine use has contributed to her sense of hopelessness and the depression as well as the overdose. She states that she feels a little bit better here knowing she is getting some help, and that her mood is not quite as despondent as it was a day or two ago. However, she still feels somewhat depressed and states that she really has nowhere to go, although historically, she has been fairly resourceful at finding options. It was reported that she may have had some auditory or even visual hallucinations of people walking by her or near her but she denies any such symptoms currently and there is no evidence of any psychosis such as other hallucinations or delusional thinking.   It should be noted that she did take a prior overdose in 12/2020 and possibly one or more in the past as well. There is no evidence of any mandeep or hypomania and no evidence of significant anxiety symptoms. PAST PSYCHIATRIC HISTORY:  She has been hospitalized several times previously 4 or 5 at [facility], most recent of which was about a year ago by her report. She also was hospitalized at [facility] in 2016 under similar circumstances. She is followed at the [facility] for skill building and sees Comfort Garland as her . She previously had been followed by the [facility] but refuses to go back for services with them. PAST MEDICAL HISTORY:  Essentially none except for history of some prior asthma. MEDICATIONS ON ADMISSION:  Essentially none at this point. She most recently was on some Vistaril and BuSpar but has not taken it in sometime. ALLERGIES:  NO KNOWN ALLERGIES. FAMILY HISTORY:  She denies any family psychiatric history. SOCIAL HISTORY:  She is single and has never been . She tells me she has 9 children. She does receive SSI. She quit school in the 9th grade. She was living with her daughter and an ex-boyfriend, but apparently has been kicked out. She does smoke about half a pack of cigarettes per day. MENTAL STATUS EXAM:  The patient was noted to be a casually dressed, adequately groomed 44-year-old who appeared her stated age. She was reasonably pleasant and cooperative with the interview and was not overly guarded or evasive. She endorsed some depressive symptoms as indicated above, but states she is not feeling fully hopeless and not having any suicidal thoughts currently. She has had some neurovegetative impairment recently but mostly only over the last several days. She denies any symptoms of psychosis at this point such as any hallucinations or delusional thinking, and none were noted objectively. Her judgment and insight are obviously impulsive and poor. Her cognitive functioning shows a below average fund of knowledge, but no deficits otherwise. DIAGNOSTIC IMPRESSION:  AXIS I:  1. Unspecified depressive disorder (F33.9). 2.  Cocaine use disorder with depressed mood (F14.24). AXIS II:  Deferred. AXIS III:  History of asthma; recent overdose. AXIS IV:  Stressors are moderate (homelessness). AXIS V:  Global Assessment of Functioning currently is 45-50. PLAN:  1. We will admit the patient for further supportive treatment, close observation, increased structure, and involvement within the groups and milieu. 2.  We will begin a trial on Trileptal taken 150 mg this morning and 300 mg b.i.d., to try to help stabilize some of the mood instability that she reports. 3.  We will also order trazodone and Vistaril on a p.r.n. basis for sleep and anxiety respectively. 4.  Estimated length of stay would likely be on the order of 3-5 days depending upon her response to treatment. I certify that this patients psychiatric hospital admission is medically necessary for treatment which could reasonably be expected to improve their condition and/or for diagnostic study. The inpatient psychiatric services are provided while the patient is under the care of a physician and are included in the individualized plan of care.      Nando Perkins MD      [id number]  D:  08/03/2021 10:38  T:  08/03/2021 11:20  JOB #:  [id number]

## 2021-08-03 NOTE — BH NOTES
Patient was admitted voluntarily to the unit for SI/hi. Patient stated she did not have a triggered and was unsure why she was having these thoughts of wanting to harm others. She stated that she always has had depression. She is not in treatment and has not been currently taking any medications. She lives with her daughter and pays rent. She is denying SI/HI currently and has been appropriate with peers and staff.

## 2021-08-03 NOTE — BH NOTES
PSYCHOSOCIAL ASSESSMENT  :Patient identifying info: Clau Cruz is a 46 y.o., female admitted 8/2/2021  1:47 PM     Presenting problem and precipitating factors: reporting SI with an overdose, reports feeling depressed and HI towards ex friend and daughter, later denied feeling SI/HI      Mental status assessment: pleasant and cooperative    Strengths:    Collateral information: patient    Current psychiatric /substance abuse providers and contact info: talks with a lady name Lanie with [facility] [address] when she needs to    Previous psychiatric/substance abuse providers and response to treatment: denies    Family history of mental illness or substance abuse: denies    Substance abuse history:  Alcohol, crack  Social History     Tobacco Use    Smoking status: Current Every Day Smoker     Packs/day: 2.00     Years: 15.00     Pack years: 30.00     Types: Cigarettes    Smokeless tobacco: Never Used   Substance Use Topics    Alcohol use: Yes     Alcohol/week: 0.8 standard drinks     Types: 1 Standard drinks or equivalent per week       History of biomedical complications associated with substance abuse : denies    Patient's current acceptance of treatment or motivation for change: no desire to change    Family constellation: 9 children    Is significant other involved?        Describe support system:     Describe living arrangements and home environment: lives with her daughter    Health issues:   Hospital Problems  Date Reviewed: 8/2/2021        Codes Class Noted POA    * (Principal) Major depressive disorder, recurrent, unspecified ([facility] ICD-10-CM: F33.9  ICD-9-CM: 296.30  8/2/2021 Yes        Depression ICD-10-CM: F32.9  ICD-9-CM: 261  8/2/2021 Unknown        Cocaine dependence with cocaine-induced mood disorder ([facility] ICD-10-CM: F14.24  ICD-9-CM: 292.84, E980.4  10/4/2016               Trauma history: denies    Legal issues: denies    History of  service: denies    Financial status: SSI    Jew/cultural factors: none expressed    Education/work history: 9th grade    Have you been licensed as a health care professional (current or ): no    Leisure and recreation preferences: watch tv    Describe coping skills: unable to identify skills    Callie Apgar  8/3/2021

## 2021-08-03 NOTE — BH NOTES
Shift change report given to Sujatha Niño ,RN re: SBAR, medications, and behavior.   Alexis fall risk score1

## 2021-08-03 NOTE — PROGRESS NOTES
Problem: Falls - Risk of  Goal: *Absence of Falls  Description: Document Odalis Gomez Fall Risk and appropriate interventions in the flowsheet.   Outcome: Progressing Towards Goal  Note: Fall Risk Interventions:            Medication Interventions: Teach patient to arise slowly                   Problem: Suicide  Goal: *STG: Attends activities and groups  Outcome: Progressing Towards Goal  Goal: *STG/LTG: Complies with medication therapy  Outcome: Progressing Towards Goal     Problem: Depressed Mood (Adult/Pediatric)  Goal: *STG: Attends activities and groups  Outcome: Progressing Towards Goal  Goal: *STG: Remains safe in hospital  Outcome: Progressing Towards Goal  Goal: *STG: Complies with medication therapy  Outcome: Progressing Towards Goal

## 2021-08-03 NOTE — GROUP NOTE
RAYMON  GROUP DOCUMENTATION INDIVIDUAL                                                                          Group Therapy Note    Date: 8/2/2021    Group Start Time: 2000  Group End Time: 2030  Group Topic: Community Meeting    [address], [address], CNA    IP [address] [facility] GROUP DOCUMENTATION GROUP    Group Therapy Note    Attendees: 4/6         Attendance: Attended    Patient's Goal:  To get better    Interventions/techniques: Supported    Follows Directions:  Followed directions    Interactions: Interacted appropriately    Mental Status: Calm    Behavior/appearance: Cooperative    Goals Achieved: Able to engage in interactions      Additional Notes:  She has no anxiety, depression, or pain at this time    Navin Marsh CNA

## 2021-08-03 NOTE — BH NOTES
Affect blunted, mood depressed. Pt attended and participated in scheduled group activities. Pt was appropriate and interacted well with staff and peers. Pt denies SI/HI/AVH/pain. Pt ate 100% of snack. Pt has no scheduled medications for night time and did not request a PRN. Pt is in no apparent distress at this time and made no delusional statements. Pt rested in her bed with her eyes closed for 7 hours.

## 2021-08-03 NOTE — GROUP NOTE
RAYMON  GROUP DOCUMENTATION INDIVIDUAL                                                                          Group Therapy Note    Date: 8/3/2021    Group Start Time: 8626  Group End Time: 1739  Group Topic: Community Meeting    [address] [facility] [address] [facility] GROUP DOCUMENTATION GROUP    Group Therapy Note    Attendees: 3         Attendance: Attended    Patient's Goal:  To stay awake and not sleep all day. Interventions/techniques: Provide feedback    Follows Directions: Followed directions    Interactions: Interacted appropriately    Mental Status: Depressed    Behavior/appearance: Cooperative    Goals Achieved: Able to listen to others      Additional Notes:  Patient appetite is good, no physical pain, no suicidal thoughts. Patient stated that she has depression level 4, no anxiety.     Meghan Stager

## 2021-08-03 NOTE — BH NOTES
Affect constricted, mood depressed/anxious. Alert and Oriented X 4. Cooperative and pleasant. Attended and participated in group. Interacted with staff and peers. Makes needs known. Ate all meals and snacks. Denied SI/HI/AVH and pain. Medication compliant. Stable with no s/s of distress.

## 2021-08-04 PROCEDURE — 74011250637 HC RX REV CODE- 250/637: Performed by: PSYCHIATRY & NEUROLOGY

## 2021-08-04 PROCEDURE — 99231 SBSQ HOSP IP/OBS SF/LOW 25: CPT | Performed by: PSYCHIATRY & NEUROLOGY

## 2021-08-04 PROCEDURE — 65220000003 HC RM SEMIPRIVATE PSYCH

## 2021-08-04 RX ORDER — OXCARBAZEPINE 300 MG/1
600 TABLET, FILM COATED ORAL 2 TIMES DAILY
Status: DISCONTINUED | OUTPATIENT
Start: 2021-08-04 | End: 2021-08-10 | Stop reason: HOSPADM

## 2021-08-04 RX ADMIN — OXCARBAZEPINE 600 MG: 300 TABLET, FILM COATED ORAL at 17:09

## 2021-08-04 RX ADMIN — OXCARBAZEPINE 300 MG: 300 TABLET, FILM COATED ORAL at 08:13

## 2021-08-04 RX ADMIN — ACETAMINOPHEN 650 MG: 325 TABLET ORAL at 21:13

## 2021-08-04 NOTE — GROUP NOTE
RAYMON  GROUP DOCUMENTATION INDIVIDUAL                                                                          Group Therapy Note    Date: 8/3/2021    Group Start Time: 2000  Group End Time: 2025  Group Topic: Community Meeting    [address] GROUP DOCUMENTATION GROUP    Group Therapy Note    Attendees: 4         Attendance: Attended    Patient's Goal:  To stay awake and not sleep all day . Interventions/techniques: Provide feedback    Follows Directions: Followed directions    Interactions: Interacted appropriately    Mental Status: Anxious, Calm and Depressed    Behavior/appearance: Cooperative    Goals Achieved: Able to listen to others, Able to self-disclose and Identified feelings      Additional Notes:  Patient said she had a good day of not sleeping all day . Patient said her depression is 7/10 . Patient said she has no pain , no anxiety ,and no SI .     [facility]

## 2021-08-04 NOTE — PROGRESS NOTES
Problem: Falls - Risk of  Goal: *Absence of Falls  Description: Document Hali Pillow Fall Risk and appropriate interventions in the flowsheet.   Outcome: Progressing Towards Goal  Note: Fall Risk Interventions:            Medication Interventions: Teach patient to arise slowly

## 2021-08-04 NOTE — BH NOTES
Affect blunted, mood depressed. Pt attended and participated in scheduled group activities. Pt was social and appropriate with staff and peers. Pt ate 100% of snack. Pt denies SI/HI/AVH/pain. Pt has no scheduled medications for evening/night and did not request a PRN. Pt is in no apparent distress at this time and made no delusional statements. Pt rested in her bed with her eyes closed for 7.25 hours.

## 2021-08-04 NOTE — BH NOTES
Shift change report given to Baylee GrierRN re: SBAR, medications, and behavior.   Alexis fall risk score 1

## 2021-08-04 NOTE — ROUTINE PROCESS
Shift change report given to Alisson Becerra Rn, re: SBAR. Medications, and behavior.   Alexis Fall Risk Score (1)

## 2021-08-04 NOTE — GROUP NOTE
RAYMON  GROUP DOCUMENTATION INDIVIDUAL                                                                          Group Therapy Note    Date: 8/4/2021    Group Start Time: 1030  Group End Time: 1100  Group Topic: Community Meeting    [address] [facility] GROUP DOCUMENTATION GROUP    Group Therapy Note    Attendees: 5         Attendance: Attended    Patient's Goal:  To stay focused    Interventions/techniques: Promoted peer support    Follows Directions:  Followed directions    Interactions: Interacted appropriately    Mental Status: Calm    Behavior/appearance: Attentive and Cooperative    Goals Achieved: Able to engage in interactions, Able to listen to others, Able to give feedback to another, Able to reflect/comment on own behavior, Able to manage/cope with feelings and Able to receive feedback      Additional Notes:  Denies suicidal ideations, anxiety and depression, no complaints of pain    Radha Caldwell

## 2021-08-04 NOTE — GROUP NOTE
RAYMON  GROUP DOCUMENTATION INDIVIDUAL                                                                          Group Therapy Note    Date: 8/4/2021    Group Start Time: 0900  Group End Time: 6737  Group Topic: Process Group - Inpatient    [address] [facility] OP    Michelle, [address] [facility] GROUP DOCUMENTATION GROUP    Group Therapy Note    Focus of session was based on handout of The Not To Do List.  We spoke about how we can get overwhelmed with what we have going on in our lives and then we add to it other peoples responsibilities and problems, thinking about the things that are out of our control, the stuff that drains us, and the stuff that doesn't need to get done at all or, at least, for right now. We spoke about how it is helpful to get the priorities in place for the things that we have to get done. Group members were asked to share their thoughts on their ability to be balanced and are their priorities in place right now          Attendance: Attended    Patient's Goal:      Verbalizes anger, guilt, and other feelings in a constructive manor              Interventions/techniques: Informed, Validated, Reinforced and Supported    Follows Directions: Followed directions    Interactions: Interacted appropriately    Mental Status: Calm and Congruent    Behavior/appearance: Attentive, Cooperative and Motivated    Goals Achieved: Able to engage in interactions, Able to listen to others, Able to give feedback to another, Able to reflect/comment on own behavior, Able to receive feedback, Able to self-disclose, Discussed coping, Identified feelings and Identified triggers      Additional Notes:  Pt participated in the group activity and engaged with the other members. We discussed communication styles and asking questions as a way to better understand information.  Pt shared  how frustrated and hurt she feels when she tries to ask a question and someone responds in a hurtful way stating she is \"retarded\" and \"stupid\"  This makes her feel bad about herself.   The group shared it is important to ask people that you trust and feel comfortable with    Cornell Rodriguez

## 2021-08-04 NOTE — PROGRESS NOTES
INTERVAL Hx:  Records and clinical information were reviewed. States she's feeling better presently--responding well to the support and socialization here. Still anxious about her situation back home--being homeless with little in the way of support, other than her CM. She states the Trileptal is agreeing with her so far and that she's willing to increase the dose further because her emotional volatility has always gotten her in trouble. Discussed Tx options and plans further. Slept 7.25 hours last night. MEDS:  Current Facility-Administered Medications   Medication Dose Route Frequency    OXcarbazepine (TRILEPTAL) tablet 600 mg  600 mg Oral BID    nicotine buccal (POLACRILEX) lozenge 2 mg  2 mg Oral Q2H PRN    acetaminophen (TYLENOL) tablet 650 mg  650 mg Oral Q4H PRN    ibuprofen (MOTRIN) tablet 400 mg  400 mg Oral Q8H PRN    magnesium hydroxide (MILK OF MAGNESIA) 400 mg/5 mL oral suspension 30 mL  30 mL Oral DAILY PRN    alum-mag hydroxide-simeth (MYLANTA) oral suspension 30 mL  30 mL Oral Q4H PRN    hydrOXYzine pamoate (VISTARIL) capsule 50 mg  50 mg Oral TID PRN    traZODone (DESYREL) tablet 50 mg  50 mg Oral QHS PRN    QUEtiapine (SEROquel) tablet 50 mg  50 mg Oral Q6H PRN    ziprasidone (GEODON) 10 mg in sterile water (preservative free) 0.5 mL injection  10 mg IntraMUSCular Q12H PRN       VITALS:   Patient Vitals for the past 12 hrs:   Temp Pulse Resp BP SpO2   08/04/21 0730 (!) 96.3 °F (35.7 °C) 62 17 137/63 97 %       LABS: .No results found for this or any previous visit (from the past 24 hour(s)). MSE:   Appearance: wearing gown; adequately groomed  Behavior: calm and cooperative; no agitation  Motor: normal  Mood/Affect: close to euthymic  Thought Process: coherent; organized  Thought Content: no delusions; no SI/HI  Perceptions: no hallucinations  Insight/Judgment: fair    ASSESSMENT:   1. Unspecified Mood/Depressive disorder (F33.9)  2.   Cocaine Use disorder with depressed mood (F14.24)    PLAN:  1. Increase the Trileptal to 600 mg BID. 2. ELOS: 4-5 days.

## 2021-08-04 NOTE — PROGRESS NOTES
Problem: Falls - Risk of  Goal: *Absence of Falls  Description: Document Delta Abdi Fall Risk and appropriate interventions in the flowsheet.   Outcome: Progressing Towards Goal  Note: Fall Risk Interventions:            Medication Interventions: Teach patient to arise slowly                   Problem: Suicide  Goal: *STG: Remains safe in hospital  Outcome: Resolved/Met  Goal: *STG: Seeks staff when feelings of self harm or harm towards others arise  Outcome: Resolved/Met  Goal: *STG: Attends activities and groups  Outcome: Resolved/Met  Goal: *STG:  Verbalizes alternative ways of dealing with maladaptive feelings/behaviors  Outcome: Resolved/Met  Goal: *STG/LTG: Complies with medication therapy  Outcome: Resolved/Met  Goal: *STG/LTG: No longer expresses self destructive or suicidal thoughts  Outcome: Resolved/Met     Problem: Depressed Mood (Adult/Pediatric)  Goal: *STG: Participates in treatment plan  Outcome: Progressing Towards Goal  Goal: *STG: Participates in 1:1 therapy sessions  Outcome: Progressing Towards Goal

## 2021-08-05 PROCEDURE — 99231 SBSQ HOSP IP/OBS SF/LOW 25: CPT | Performed by: PSYCHIATRY & NEUROLOGY

## 2021-08-05 PROCEDURE — 65220000003 HC RM SEMIPRIVATE PSYCH

## 2021-08-05 PROCEDURE — 74011250637 HC RX REV CODE- 250/637: Performed by: PSYCHIATRY & NEUROLOGY

## 2021-08-05 RX ADMIN — OXCARBAZEPINE 600 MG: 300 TABLET, FILM COATED ORAL at 08:51

## 2021-08-05 RX ADMIN — OXCARBAZEPINE 600 MG: 300 TABLET, FILM COATED ORAL at 17:35

## 2021-08-05 RX ADMIN — TRAZODONE HYDROCHLORIDE 50 MG: 50 TABLET ORAL at 20:49

## 2021-08-05 NOTE — BH NOTES
Patient is reporting feeling much better. She has been speaking with her . SW contacted her and would like us to refer her to [facility] for crisis stabilization. SW will contact program and see if she can get in. This program also provides wrap around services and housing which is her biggest cause of hospitalizations. She also provided information on getting patient to change her payee. SW will speak with patient about that and encourage her to do so. Spoke with the crisis stab unit to make the referral, she is checking her insurance to make sure she has the benefits for crisis, she will have a bed open early next week.     Crisis called back and they are able to accept her and can do so on Tuesday August 10 at 1pm.

## 2021-08-05 NOTE — BH NOTES
Affect blunted, mood depressed. Patient is alert & oriented x 4. Patient speech clear and coherent. Patient answers questions appropriately. Patient denies SI/HI/AVH/pain. No delusional statement made. Patient remains paranoid. Patient is compliant with medications no PRN's given. Patient appetite good eats 100% of all meals plus snacks. Patient attended and engaged in groups with prompting. Patient in no apparent distress all vital signs within normal limits.

## 2021-08-05 NOTE — BH NOTES
Affect blunted,mood depressed. Pt attended and participated in scheduled group activities. Pt was social and appropriate with staff and peers. Pt ate 100% of snack. Pt denies SI/HI/AVH but stated that she had pain in her right thumb. Pt has no scheduled medications for evening or night but did request a PRN. Pt is in no apparent distress at this time and made no delusional statements. Pt rested in her bed with her eyes closed for 7.5 hours.     PRN Medication Documentation    Specific patient behavior that led to need for PRN medication: thumb pain  Staff interventions attempted prior to PRN being given: Pt requested  PRN medication given: Tylenol 650 mg PO @ 2113  Patient response/effectiveness of PRN medication: Pt rested

## 2021-08-05 NOTE — BH NOTES
Shift change report given to Paulina Molina re: SBAR, medications, and behavior.   Alexis fall risk score: 1

## 2021-08-05 NOTE — PROGRESS NOTES
INTERVAL Hx:  Records and clinical information were reviewed. States she's feeling much better at this point and that she's responding well to the support and socialization here. Still anxious about her situation back home--being homeless with little in the way of support, other than her CM. She states the increased Trileptal is agreeing with her so far--no SE's at all. Discussed Tx options and plans further. Slept 7.5 hours last night. MEDS:  Current Facility-Administered Medications   Medication Dose Route Frequency    OXcarbazepine (TRILEPTAL) tablet 600 mg  600 mg Oral BID    nicotine buccal (POLACRILEX) lozenge 2 mg  2 mg Oral Q2H PRN    acetaminophen (TYLENOL) tablet 650 mg  650 mg Oral Q4H PRN    ibuprofen (MOTRIN) tablet 400 mg  400 mg Oral Q8H PRN    magnesium hydroxide (MILK OF MAGNESIA) 400 mg/5 mL oral suspension 30 mL  30 mL Oral DAILY PRN    alum-mag hydroxide-simeth (MYLANTA) oral suspension 30 mL  30 mL Oral Q4H PRN    hydrOXYzine pamoate (VISTARIL) capsule 50 mg  50 mg Oral TID PRN    traZODone (DESYREL) tablet 50 mg  50 mg Oral QHS PRN    QUEtiapine (SEROquel) tablet 50 mg  50 mg Oral Q6H PRN    ziprasidone (GEODON) 10 mg in sterile water (preservative free) 0.5 mL injection  10 mg IntraMUSCular Q12H PRN       VITALS:   Patient Vitals for the past 12 hrs:   Temp Pulse Resp BP SpO2   08/05/21 0720 97.6 °F (36.4 °C) 64 16 126/76 97 %       LABS: .No results found for this or any previous visit (from the past 24 hour(s)). MSE:   Appearance: wearing gown; adequately groomed  Behavior: calm and cooperative; no agitation  Motor: normal  Mood/Affect: close to euthymic  Thought Process: coherent; organized  Thought Content: no delusions; no SI/HI  Perceptions: no hallucinations  Insight/Judgment: fair    ASSESSMENT:   1. Unspecified Mood/Depressive disorder (F33.9)  2. Cocaine Use disorder with depressed mood (F14.24)    PLAN:  1. Continue the Trileptal at 600 mg BID. 2. ELOS: 4-5 days.

## 2021-08-05 NOTE — GROUP NOTE
RAYMON  GROUP DOCUMENTATION INDIVIDUAL                                                                          Group Therapy Note    Date: 8/5/2021    Group Start Time: 1030  Group End Time: 0493  Group Topic: Community Meeting    [address] [facility] GROUP DOCUMENTATION GROUP    Group Therapy Note    Attendees: 3         Attendance: Attended    Patient's Goal:  To talk to her     Interventions/techniques: Provide feedback    Follows Directions: Followed directions    Interactions: Interacted appropriately    Mental Status: Calm    Behavior/appearance: Attentive and Cooperative    Goals Achieved: Able to engage in interactions and Able to listen to others      Additional Notes:  Patient had no pain and no suicidal thoughts.     Lenore Hurst

## 2021-08-05 NOTE — PROGRESS NOTES
Problem: Chemical Dependency (Adult/Pediatric)  Goal: *STG: Seeks staff when symptoms of withdrawal increase  Outcome: Resolved/Met  Goal: *STG: Complies with medication therapy  Outcome: Resolved/Met  Goal: *STG: Attends activities and groups  Outcome: Resolved/Met  Goal: *STG: Verbalizes abstinence as an achievable goal  Outcome: Resolved/Met

## 2021-08-05 NOTE — PROGRESS NOTES
Problem: Falls - Risk of  Goal: *Absence of Falls  Description: Document Jenniffer Mary Fall Risk and appropriate interventions in the flowsheet.   Outcome: Progressing Towards Goal  Note: Fall Risk Interventions:            Medication Interventions: Teach patient to arise slowly                   Problem: Patient Education: Go to Patient Education Activity  Goal: Patient/Family Education  Outcome: Progressing Towards Goal

## 2021-08-05 NOTE — GROUP NOTE
RAYMON  GROUP DOCUMENTATION INDIVIDUAL                                                                          Group Therapy Note    Date: 8/5/2021    Group Start Time: 1400  Group End Time: 4301  Group Topic: Process Group - Inpatient    [address], [address] [facility] GROUP DOCUMENTATION GROUP    Group Therapy Note    Attendees: Focus of session was based on the book 13 Things Mentally Strong People Don't Do by Kristina Huitron. We spoke about the information about how we give away our power and how that leads us to be mentally weak. We spoke about examples from the book as to how we give our power away such as feeling deeply affected by criticism from others, over [address] or apologizing for things that our not our fault, changing goals based on others, and spending a lot of time complaining about others and circumstances. We spoke about how giving away our power makes us feel and how what we can do to manage this and reduce our time and energy spent on others. Attendance: Attended    Patient's Goal:       Returns to previous level of functioning and participates with after care plan             Interventions/techniques: Informed, Validated, Promoted peer support and Supported    Follows Directions: Followed directions    Interactions: Disorganized interaction    Mental Status: Calm, Congruent and Preoccupied    Behavior/appearance: Attentive, Cooperative and Needed prompting    Goals Achieved: Able to engage in interactions, Able to give feedback to another, Able to receive feedback, Able to self-disclose, Discussed coping, Identified feelings, Identified triggers and Identified relapse prevention strategies      Additional Notes: [facility] participated in group with prompting. She spoke about how she worries a lot about other people and how they affect her. She continues to stay focused on other people and what they do and she has limited insight into herself.       Lina Armijo Mercedes

## 2021-08-06 PROCEDURE — 99231 SBSQ HOSP IP/OBS SF/LOW 25: CPT | Performed by: PSYCHIATRY & NEUROLOGY

## 2021-08-06 PROCEDURE — 74011250637 HC RX REV CODE- 250/637: Performed by: PSYCHIATRY & NEUROLOGY

## 2021-08-06 PROCEDURE — 65220000003 HC RM SEMIPRIVATE PSYCH

## 2021-08-06 RX ORDER — QUETIAPINE FUMARATE 25 MG/1
50 TABLET, FILM COATED ORAL
Status: DISCONTINUED | OUTPATIENT
Start: 2021-08-06 | End: 2021-08-10 | Stop reason: HOSPADM

## 2021-08-06 RX ADMIN — OXCARBAZEPINE 600 MG: 300 TABLET, FILM COATED ORAL at 09:03

## 2021-08-06 RX ADMIN — IBUPROFEN 400 MG: 400 TABLET ORAL at 21:06

## 2021-08-06 RX ADMIN — QUETIAPINE FUMARATE 50 MG: 25 TABLET ORAL at 21:06

## 2021-08-06 RX ADMIN — OXCARBAZEPINE 600 MG: 300 TABLET, FILM COATED ORAL at 17:28

## 2021-08-06 NOTE — GROUP NOTE
RAYMON  GROUP DOCUMENTATION INDIVIDUAL                                                                          Group Therapy Note    Date: 8/6/2021    Pt did not attend group today.

## 2021-08-06 NOTE — BH NOTES
Affect constricted, mood depressed/anxious. Alert and Oriented X 4. Cooperative and pleasant. Attended and participated in group. Interacted with staff and peers. Makes needs known. Ate  snacks. Denied SI/HI/AVH and pain. Medication compliant. Stable with no s/s of distress.  Laid in bed with eyes closed for 8 hours

## 2021-08-06 NOTE — BH NOTES
Cooperative and polite. Active participant in group, although left early. Med compliant. Denies SI/HI/AVH/pain. Will continue Q 15min safety checks and fall prevention, along with group therapy.

## 2021-08-06 NOTE — BH NOTES
Pt attended activity group and was an active participant. Today's group was a health and beauty group. Pt had nails polished by staff. Pt calm and cooperative during group.

## 2021-08-06 NOTE — PROGRESS NOTES
Problem: Depressed Mood (Adult/Pediatric)  Goal: *STG: Participates in treatment plan  Outcome: Progressing Towards Goal  Note: Cooperative and polite. Active participant in group, although left early. Med compliant. Denies SI/HI/AVH/pain. Will continue Q 15min safety checks and fall prevention, along with group therapy.

## 2021-08-06 NOTE — PROGRESS NOTES
INTERVAL Hx:  Records and clinical information were reviewed. States she's still feeling better at this point, although she's a bit anxious about her disposition. However, her CM has arranged for her to transition to a CSU on Tuesday--the [facility]. States the Trazodone doesn't help with sleep as much as the Seroquel used to--will change to Seroquel at 50 mg QHS as it'll help with her mood a little better. States the increased Trileptal is agreeing with her so far--no SE's at all. Discussed Tx options and plans further. Slept 8 hours last night. MEDS:  Current Facility-Administered Medications   Medication Dose Route Frequency    QUEtiapine (SEROquel) tablet 50 mg  50 mg Oral QHS    OXcarbazepine (TRILEPTAL) tablet 600 mg  600 mg Oral BID    nicotine buccal (POLACRILEX) lozenge 2 mg  2 mg Oral Q2H PRN    acetaminophen (TYLENOL) tablet 650 mg  650 mg Oral Q4H PRN    ibuprofen (MOTRIN) tablet 400 mg  400 mg Oral Q8H PRN    magnesium hydroxide (MILK OF MAGNESIA) 400 mg/5 mL oral suspension 30 mL  30 mL Oral DAILY PRN    alum-mag hydroxide-simeth (MYLANTA) oral suspension 30 mL  30 mL Oral Q4H PRN    hydrOXYzine pamoate (VISTARIL) capsule 50 mg  50 mg Oral TID PRN    QUEtiapine (SEROquel) tablet 50 mg  50 mg Oral Q6H PRN       VITALS:   Patient Vitals for the past 12 hrs:   Temp Pulse Resp BP SpO2   08/06/21 0720 96.9 °F (36.1 °C) 61 17 (!) 153/75 97 %       LABS: .No results found for this or any previous visit (from the past 24 hour(s)). MSE:   Appearance: wearing gown; adequately groomed  Behavior: calm and cooperative; no agitation  Motor: normal  Mood/Affect: close to euthymic  Thought Process: coherent; organized  Thought Content: no delusions; no SI/HI  Perceptions: no hallucinations  Insight/Judgment: fair    ASSESSMENT:   1. Unspecified Mood/Depressive disorder (F33.9)  2. Cocaine Use disorder with depressed mood (F14.24)    PLAN:  1.  Continue the Trileptal at 600 mg BID.  2. D/C Trazodone and start Seroquel at 50 mg QHS. 3. ELOS: D/C on Tuesday to the CSU.

## 2021-08-06 NOTE — GROUP NOTE
RAYMON  GROUP DOCUMENTATION INDIVIDUAL                                                                          Group Therapy Note    Date: 8/5/2021    Group Start Time: 2000  Group End Time: 2030  Group Topic: Community Meeting    [address] [place] [address] GROUP DOCUMENTATION GROUP    Group Therapy Note    Attendees: 3         Attendance: Attended    Patient's Goal:  To stay awake until 10:00 pm.    Interventions/techniques: Provide feedback    Follows Directions: Followed directions    Interactions: Interacted appropriately    Mental Status: Calm    Behavior/appearance: Cooperative    Goals Achieved: Able to engage in interactions      Additional Notes: Mayda ate good today and will sleep good tonight. Nothing good or bad happened today. She doesn't have any anxiety or depression. [facility] hasn't had any thoughts of hurting herself or anyone else. She does have a pain level of 8 on the 0-10 scale, the pain is in her thumb. The nurse was informed of Mayda's pain.     Hollie Cooper

## 2021-08-06 NOTE — BH NOTES
Shift change report given to [place] Doctor re: SBAR, medications, and behavior.   Alexis fall risk score 1

## 2021-08-07 PROCEDURE — 99231 SBSQ HOSP IP/OBS SF/LOW 25: CPT | Performed by: PSYCHIATRY & NEUROLOGY

## 2021-08-07 PROCEDURE — 65220000003 HC RM SEMIPRIVATE PSYCH

## 2021-08-07 PROCEDURE — 74011250637 HC RX REV CODE- 250/637: Performed by: PSYCHIATRY & NEUROLOGY

## 2021-08-07 RX ADMIN — OXCARBAZEPINE 600 MG: 300 TABLET, FILM COATED ORAL at 08:34

## 2021-08-07 RX ADMIN — OXCARBAZEPINE 600 MG: 300 TABLET, FILM COATED ORAL at 17:38

## 2021-08-07 RX ADMIN — QUETIAPINE FUMARATE 50 MG: 25 TABLET ORAL at 21:07

## 2021-08-07 NOTE — BH NOTES
Affect blunted. Mood anxious. Cooperative and med compliant. Interacts well with others. Denies SI/HI/AVH/pain. Plan is to \"go to Crisis shelter and then get my own apartment again where nobody will tell me what to do. \" Ate 100% meal and attended group activity. Quiet most of time.

## 2021-08-07 NOTE — BH NOTES
Affect blunted, mood depressed/anxious. Attended and participated in group. Ate snacks. Interacting with staff and peers. Denied SI/HI/AVH. Watched movies with peers. Medication compliant. Pleasant and co-operative. PRN Medication Documentation    Specific patient behavior that led to need for PRN medication:C/o right thumb pain at 7/10. Staff interventions attempted prior to PRN being given: Assessed pt. PRN medication given: Motrin 400 mg PO at 2106. Patient response/effectiveness of PRN medication: Motrin was effective. Pain now at 1/10. Pt rested quietly in bed with eyes closed for 7.75 hours. Respirations even and unlabored. Pt in no apparent distress.

## 2021-08-07 NOTE — PROGRESS NOTES
Subjective:  SLEPT 7.5 HOURS. NO SI/HI/AVH. NO SIDE EFFECTS OF MEDICATIONS NOTED     SOMEWHAT FORGETFUL. Objective:   Vitals: VITALS ARE STABLE AND WITHIN NORMAL LIMITS  Labs: NO NEW LABS AT THIS TIME    Mental Status Exam:  Appearance: FAIRLY GROOMED,   Behavior: COOPERATIVE, POLITE  Motor: NO PSYCHOMOTOR AGITATION OR RETARDATION NOTED  Speech: NORMAL RATE, VOLUME AND PROSODY  Mood: OK\"  Affect:  STABLE  Thought Content: NO SI/HI/AVH,   Thought Process: LINEAR AND GOAL ORIENTED  Orientation: A&O X 4  Insight/Judgment: FAIR  X 2    Assessment:   1. Unspecified Mood/Depressive disorder (F33.9)  2. Cocaine Use disorder with depressed mood (F14.24)    Plan:   CONT CURRENT TREATMENT PLAN    REASON FOR CONTINUED STAY: DC PLANNING  1. I certify that the patient needs 24 hours of medical supervision to improve the above conditions. 2. The current mental illness is classified as severe. 3. Outpatient services only are insufficient currently to treat this patient's current psychiatric condition. 4. There is continued need for psychiatric inpatient treatment to address the above condition. 5. I certify that current psychiatric treatment could be reasonably expected to improve the patient's condition. 6. I certify that the patient should expect to make improvements as a result of inpatient psychiatric services  7. The risks and benefits of treatment were discussed with the patient.

## 2021-08-07 NOTE — GROUP NOTE
RAYMON  GROUP DOCUMENTATION INDIVIDUAL                                                                          Group Therapy Note    Date: 8/7/2021    Group Start Time: 1030  Group End Time: 3391  Group Topic: Community Meeting    [address] [facility] GROUP DOCUMENTATION GROUP    Group Therapy Note    Attendees: 3         Attendance: Attended    Patient's Goal:  No goal for today. Interventions/techniques: Provide feedback    Follows Directions: Followed directions    Interactions: Interacted appropriately    Mental Status: Anxious    Behavior/appearance: Attentive and Cooperative    Goals Achieved: Able to engage in interactions and Able to listen to others      Additional Notes:  Patient had no pain and no suicidal thoughts.     Gaurav Orourke

## 2021-08-07 NOTE — PROGRESS NOTES
Problem: Depressed Mood (Adult/Pediatric)  Goal: *STG: Participates in treatment plan  Outcome: Progressing Towards Goal  Note: Affect blunted. Mood anxious. Cooperative and med compliant. Interacts well with others. Denies SI/HI/AVH/pain. Plan is to \"go to Crisis shelter and then get my own apartment again where nobody will tell me what to do. \" Ate 100% meal and attended group activity. Quiet most of time.

## 2021-08-07 NOTE — ROUTINE PROCESS
Shift change report given to TAYLA Wilson RN, re: SBAR, medications, and behavior. Alexis Score - 1.

## 2021-08-07 NOTE — MASTER TREATMENT PLAN
Master Treatment Plan Review for Amarilis Silveira    Date of Admission Date Treatment Plan Reviewed Anticipated Date of Discharge Legal Status    08/02/2021 08/06/2021 08/10/2021 Voluntary     Treatment & Discharge Planning Changes    Modality or Intervention Changes   08/03/2021 - Lipid, TSH   Psychotropic Medication Changes 08/06/2021 - Seroquel 50 mg PO QHS  08/06/2021 - D/C Trazodone 50 mg PO QHS PRN for sleep  08/06/2021 - D/C Geodon 10 mg IM Q 6 hours PRN for severe agitation   Discharge Planning or Target Date Changes   ELOS: D/C on Tuesday to the CSU   New Issues   N/A     Treatment Team Signatures    I have participated in the development of this plan of treatment and agree to its implementation.     Patient Signature       Patient Printed Name Date/Time   /Therapist Signature       //Therapist Printed Name Date/Time   RN Signature       RN Printed Name      Lesli Platt RN Date/Time      08/06/21/2118   Unit  Signature       Unit  Printed Name Date/Time   MD Signature       MD Printed Name Date/Time

## 2021-08-07 NOTE — PROGRESS NOTES
Problem: Depressed Mood (Adult/Pediatric)    Goal: *STG: Complies with medication therapy    Affect blunted, mood depressed/anxious. Denied SI or intent to harm self. Medication compliant without incident. Assessed/monitored potential harm to self. Encouraged sharing of feelings. Monitored medication compliance and effectiveness. Reinforced abilities and accomplishments. Provided encouragement, reassurance, and support.      Outcome: Progressing Towards Goal

## 2021-08-07 NOTE — GROUP NOTE
RAYMON  GROUP DOCUMENTATION INDIVIDUAL                                                                          Group Therapy Note    Date: 8/6/2021    Group Start Time: 2000  Group End Time: 2030  Group Topic: Community Meeting    [facility] GROUP DOCUMENTATION GROUP    Group Therapy Note    Attendees: 3/5         Attendance: Attended    Patient's Goal:  Watch this movie. Interventions/techniques: Supported    Follows Directions: Followed directions    Interactions: Interacted appropriately    Mental Status: Calm    Behavior/appearance: Cooperative    Goals Achieved: Able to engage in interactions      Additional Notes:  She has no anxiety, or depression. She has right finger pain at a 7.     Zelalem Tracy CNA

## 2021-08-08 PROCEDURE — 99231 SBSQ HOSP IP/OBS SF/LOW 25: CPT | Performed by: PSYCHIATRY & NEUROLOGY

## 2021-08-08 PROCEDURE — 74011250637 HC RX REV CODE- 250/637: Performed by: PSYCHIATRY & NEUROLOGY

## 2021-08-08 PROCEDURE — 65220000003 HC RM SEMIPRIVATE PSYCH

## 2021-08-08 RX ADMIN — QUETIAPINE FUMARATE 50 MG: 25 TABLET ORAL at 21:15

## 2021-08-08 RX ADMIN — IBUPROFEN 400 MG: 400 TABLET ORAL at 11:29

## 2021-08-08 RX ADMIN — OXCARBAZEPINE 600 MG: 300 TABLET, FILM COATED ORAL at 17:33

## 2021-08-08 RX ADMIN — IBUPROFEN 400 MG: 400 TABLET ORAL at 04:28

## 2021-08-08 RX ADMIN — OXCARBAZEPINE 600 MG: 300 TABLET, FILM COATED ORAL at 08:27

## 2021-08-08 NOTE — BH NOTES
Affect blunted. Mood depressed. Denies SE's of meds. Denies SI/HI/AVH. Talked about the Crisis center she will go to and has a skill builder  that helps her out of hospital.  Goal is to live on her own. Cooperative and med compliant. Alert and oriented x 3. Motrin helping abd cramps. Interacts well with others. No agitation or anger noted. Active participant in group activity. Ate all of meals.     PRN Medication Documentation    Specific patient behavior that led to need for PRN medication:abd cramps 6/10  Staff interventions attempted prior to PRN being given: assess  PRN medication given: Motrin 400mg po given at [id number]  Patient response/effectiveness of PRN medication: pending

## 2021-08-08 NOTE — GROUP NOTE
RAYMON  GROUP DOCUMENTATION INDIVIDUAL                                                                          Group Therapy Note    Date: 8/8/2021    Group Start Time: 1030  Group End Time: 6206  Group Topic: Community Meeting    [address] [facility] GROUP DOCUMENTATION GROUP    Group Therapy Note    Attendees: 3         Attendance: Attended    Patient's Goal:  To stay awake    Interventions/techniques: Promoted peer support    Follows Directions:  Followed directions    Interactions: Interacted appropriately    Mental Status: Calm    Behavior/appearance: Attentive and Cooperative    Goals Achieved: Able to engage in interactions, Able to listen to others, Able to give feedback to another and Able to reflect/comment on own behavior      Additional Notes:  No complaints of pain, anxiety or depression, pt denies suicidal ideations     Mike Metzger

## 2021-08-08 NOTE — PROGRESS NOTES
Problem: Suicide  Goal: *LTG:  Identifies available community resources  Outcome: Progressing Towards Goal  Note: Affect blunted. Mood depressed. Denies SE's of meds. Denies SI/HI/AVH. Talked about the Crisis center she will go to and has a skill builder  that helps her out of hospital.  Goal is to live on her own. Cooperative and med compliant. Alert and oriented x 3. Motrin helping abd cramps. Interacts well with others. No agitation or anger noted. Active participant in group activity. Ate all of meals.

## 2021-08-08 NOTE — PROGRESS NOTES
Subjective:  SLEPT 7.75 HOURS.      NO SI/HI/AVH. NO SIDE EFFECTS OF MEDICATIONS NOTED      SOMEWHAT FORGETFUL. C/O LEG PAIN.       Objective:   Vitals: /96  Labs: NO NEW LABS AT THIS TIME     Mental Status Exam:  Appearance: FAIRLY GROOMED,   Behavior: COOPERATIVE, POLITE  Motor: NO PSYCHOMOTOR AGITATION OR RETARDATION NOTED  Speech: NORMAL RATE, VOLUME AND PROSODY  Mood: OK\"  Affect:  STABLE  Thought Content: NO SI/HI/AVH,   Thought Process: LINEAR AND GOAL ORIENTED  Orientation: A&O X 4  Insight/Judgment: FAIR  X 2     Assessment:   1.  Unspecified Mood/Depressive disorder (F33.9)  2.  Cocaine Use disorder with depressed mood (F14.24)     Plan:   CONT CURRENT TREATMENT PLAN     REASON FOR CONTINUED STAY: DC PLANNING    1. I certify that the patient needs 24 hours of medical supervision to improve the above conditions. 2. The current mental illness is classified as severe. 3. Outpatient services only are insufficient currently to treat this patient's current psychiatric condition. 4. There is continued need for psychiatric inpatient treatment to address the above condition. 5. I certify that current psychiatric treatment could be reasonably expected to improve the patient's condition. 6. I certify that the patient should expect to make improvements as a result of inpatient psychiatric services  7. The risks and benefits of treatment were discussed with the patient.

## 2021-08-08 NOTE — PROGRESS NOTES
Problem: Depressed Mood (Adult/Pediatric)    Goal: *STG: Complies with medication therapy    Affect blunted, mood anxious. Denied SI or intent to harm self. Medication compliant consistently. Assessed/monitored potential harm to self. Encouraged sharing of feelings. Monitored medication compliance and effectiveness. Reinforced abilities and accomplishments. Offered encouragement, reassurance, and support.     Outcome: Resolved/Met

## 2021-08-08 NOTE — BH NOTES
Affect blunted, mood anxious. Attended and participated in group. Ate snacks. Interacting with staff and peers. Watched movies with peers. Denied SI/HI/AVH/pain. Medication compliant. Pleasant and co-operative. PRN Medication Documentation    Specific patient behavior that led to need for PRN medication:C/io lower back pain at 8/10. Staff interventions attempted prior to PRN being given: Assessed pt. PRN medication given:Motrin 400 mg PO at 0428. Patient response/effectiveness of PRN medication:Motrin was effective. Pt currently without pain. Pt rested quietly in bed with eyes closed for 7.75 hours. Respirations even and unlabored. Pt in no apparent distress.

## 2021-08-08 NOTE — GROUP NOTE
RAYMON  GROUP DOCUMENTATION INDIVIDUAL                                                                          Group Therapy Note    Date: 8/7/2021    Group Start Time: 2000  Group End Time: 2030  Group Topic: Community Meeting    [address] [facility] GROUP DOCUMENTATION GROUP    Group Therapy Note    Attendees: 3         Attendance: Attended    Patient's Goal:  none    Interventions/techniques: Supported    Follows Directions: Followed directions    Interactions: Interacted appropriately    Mental Status: Calm and Flat    Behavior/appearance: Cooperative    Goals Achieved: Able to engage in interactions, Able to listen to others and Able to give feedback to another      Additional Notes: Shun Javed was out for the [employer]. She stated that she is having a good day, ate good, and hopes to sleep well tonight. She is having no anxiety nor depression. Nothing good nor bad happened for her today. She has no thoughts of hurting herself nor anyone else and is in no pain.     Chong Madsen CNA

## 2021-08-09 PROCEDURE — 99232 SBSQ HOSP IP/OBS MODERATE 35: CPT | Performed by: PSYCHIATRY & NEUROLOGY

## 2021-08-09 PROCEDURE — 65220000003 HC RM SEMIPRIVATE PSYCH

## 2021-08-09 PROCEDURE — 74011250637 HC RX REV CODE- 250/637: Performed by: PSYCHIATRY & NEUROLOGY

## 2021-08-09 RX ADMIN — QUETIAPINE FUMARATE 50 MG: 25 TABLET ORAL at 21:02

## 2021-08-09 RX ADMIN — IBUPROFEN 400 MG: 400 TABLET ORAL at 09:00

## 2021-08-09 RX ADMIN — OXCARBAZEPINE 600 MG: 300 TABLET, FILM COATED ORAL at 17:34

## 2021-08-09 RX ADMIN — OXCARBAZEPINE 600 MG: 300 TABLET, FILM COATED ORAL at 08:33

## 2021-08-09 NOTE — BH NOTES
Shift change report given to Patt Valle re: SBAR, medications, and behavior.   Alexis fall risk score: 1

## 2021-08-09 NOTE — BH NOTES
Patient reports feeling much better and her mood she feels is stable. She is scheduled to go to Crisis tomorrow at [place] [address] [phone]. She will be transported by [facility] taxi trip# [id number] phone number [phone]. She is looking forward to the services that she will be offered. She was involved and agreeable in her discharge plan. She has been social and appropriate with peers and staff. She denies SI/HI/AVH.

## 2021-08-09 NOTE — PROGRESS NOTES
INTERVAL Hx:  Records and clinical information from the weekend were reviewed. Says she's still feeling better overall, and that her mood has been fairly bright and stable. Looking forward to going to the CSU tomorrow, although she's a little anxious about it. Tolerating the meds well with no SE's reported at all. Slept 6.75 hours last night--Seroquel working better than the Trazodone. Plan for D/C tomorrow to transition to a CSU--the [facility]. MEDS:  Current Facility-Administered Medications   Medication Dose Route Frequency    QUEtiapine (SEROquel) tablet 50 mg  50 mg Oral QHS    OXcarbazepine (TRILEPTAL) tablet 600 mg  600 mg Oral BID    nicotine buccal (POLACRILEX) lozenge 2 mg  2 mg Oral Q2H PRN    acetaminophen (TYLENOL) tablet 650 mg  650 mg Oral Q4H PRN    ibuprofen (MOTRIN) tablet 400 mg  400 mg Oral Q8H PRN    magnesium hydroxide (MILK OF MAGNESIA) 400 mg/5 mL oral suspension 30 mL  30 mL Oral DAILY PRN    alum-mag hydroxide-simeth (MYLANTA) oral suspension 30 mL  30 mL Oral Q4H PRN    hydrOXYzine pamoate (VISTARIL) capsule 50 mg  50 mg Oral TID PRN    QUEtiapine (SEROquel) tablet 50 mg  50 mg Oral Q6H PRN       VITALS:   Patient Vitals for the past 12 hrs:   Temp Pulse Resp BP SpO2   08/09/21 0753 97.1 °F (36.2 °C) 64 16 (!) 174/81 94 %       LABS: .No results found for this or any previous visit (from the past 24 hour(s)). MSE:   Appearance: wearing gown; adequately groomed  Behavior: calm and cooperative; no agitation  Motor: normal  Mood/Affect: close to euthymic  Thought Process: coherent; organized  Thought Content: no delusions; no SI/HI  Perceptions: no hallucinations  Insight/Judgment: fair    ASSESSMENT:   1. Unspecified Mood/Depressive disorder (F33.9)  2. Cocaine Use disorder with depressed mood (F14.24)    PLAN:  1. Continue the Trileptal at 600 mg BID. 2. Continue the Seroquel at 50 mg QHS. 3. Plan for D/C tomorrow-- to go the CSU.      I certify that the inpatient psychiatric hospital services furnished since the previous certification/re-certification were, and continue to be, medically necessary for treatment which could reasonably be expected to improve the patients condition and/or for diagnostic study. This patient continues to need, on a daily basis, active treatment furnished directly by or under the supervision of inpatient psychiatric personnel.

## 2021-08-09 NOTE — BH NOTES
Affect blunted, mood depressed. Attended and participated in group. Ate snacks. Interacting with staff and peers. Denied SI/HI/AVH/pain. watched movies with peers. Medication compliant. Pleasant and co-operative. Pt rested quietly in bed with eyes closed for 6.75 hours. Respirations even and unlabored. Pt in no apparent distress.

## 2021-08-09 NOTE — GROUP NOTE
RAYMON  GROUP DOCUMENTATION INDIVIDUAL                                                                          Group Therapy Note    Date: 8/8/2021    Group Start Time: 2000  Group End Time: 2030  Group Topic: Community Meeting    [address] [place] [address] GROUP DOCUMENTATION GROUP    Group Therapy Note    Attendees: 5         Attendance: Attended    Patient's Goal:  To stay awake    Interventions/techniques: Supported    Follows Directions: Followed directions    Interactions: Interacted appropriately    Mental Status: Calm, Flat and Labile    Behavior/appearance: Cooperative and Withdrawn/quiet    Goals Achieved: Able to engage in interactions, Able to listen to others and Able to give feedback to another      Additional Notes: Peg So was out for the [employer]. She stated that she is having a great day, ate good, and will sleep pretty good tonight. She is having no anxiety nor depression. Watching TV was a good thing for her today, but nothing bad has happened today. She has no thoughts of hurting anyone else nor herself and is in no pain.     Yessy Acosta CNA

## 2021-08-09 NOTE — BH NOTES
Affect blunted, mood euthymic  Patient alert and oriented x 4. Patient speech clear and coherent. Patient answers questions appropriately. Patient denies SI/HI/AVH. No delusional statements made. Patient is compliant with medications and PRN's given. Patient appetite good eats 100% of all meals and snacks. Patient attended and engaged in groups with prompting. Patient in no apparent distress. Patient will be discharged home tomorrow if continues to be stable. PRN Medication Documentation    Specific patient behavior that led to need for PRN medication: Patient c/o ankle pain. Staff interventions attempted prior to PRN being given: Assessment done. PRN medication given: Motrin 400 mg po @ 0900. Patient response/effectiveness of PRN medication: Positive effects.

## 2021-08-09 NOTE — PROGRESS NOTES
Problem: Depressed Mood (Adult/Pediatric)  Goal: *STG: Participates in treatment plan  Outcome: Progressing Towards Goal  Goal: *STG: Attends activities and groups  Outcome: Progressing Towards Goal  Goal: *STG: Remains safe in hospital  Outcome: Progressing Towards Goal  Goal: *LTG: Understands illness and can identify signs of relapse  Outcome: Progressing Towards Goal     Problem: Patient Education: Go to Patient Education Activity  Goal: Patient/Family Education  Outcome: Progressing Towards Goal

## 2021-08-09 NOTE — PROGRESS NOTES
Problem: Falls - Risk of  Goal: *Absence of Falls  Description: Document Ivana Capps Fall Risk and appropriate interventions in the flowsheet.   Outcome: Resolved/Met     Problem: Suicide  Goal: *LTG:  Identifies available community resources  Outcome: Resolved/Met  Goal: *LTG:  Develops proactive suicide prevention plan  Outcome: Resolved/Met  Goal: Interventions  Outcome: Resolved/Met     Problem: Patient Education: Go to Patient Education Activity  Goal: Patient/Family Education  Outcome: Resolved/Met

## 2021-08-09 NOTE — GROUP NOTE
RAYMON  GROUP DOCUMENTATION INDIVIDUAL                                                                          Group Therapy Note    Date: 8/9/2021    Group Start Time: 0900  Group End Time: 0601  Group Topic: Process Group - Inpatient    [address] [facility] GROUP DOCUMENTATION GROUP    Group Therapy Note  Focus of session was based on article 5 Crucial Coping Skills That Take Time in Sobriety.   I spoke with group members about how all these coping strategies apply to being in mental health recovery as well as working to make positive changes in our lives. The skills we spoke about were: Letting go of a victim mentality, learning to deal with resentment, taking ownership of mistakes, being honest, and practicing gratitude.   We spoke about the aspects of these skills and why they are more difficult to develop. We spoke about which skill group members are willing to work to develop. Attendance: Attended    Patient's Goal:      Verbalizes anger, guilt, and other feelings in a constructive manor              Interventions/techniques: Informed, Validated, Reinforced and Supported    Follows Directions: Followed directions    Interactions: Interacted appropriately    Mental Status: Congruent and Preoccupied    Behavior/appearance: Attentive and Cooperative    Goals Achieved: Able to engage in interactions, Able to listen to others, Able to give feedback to another, Able to reflect/comment on own behavior, Able to receive feedback, Able to self-disclose, Discussed coping, Discussed discharge plans and Identified triggers      Additional Notes:  Pt participated in the group activity and engaged with other members. She stated that she is going to be discharged Tuesday and is looking forward to going back to [place]. We discussed discharge planning and coping strategies for when she returns home.     Kenzie Zavala

## 2021-08-09 NOTE — GROUP NOTE
RAYMON  GROUP DOCUMENTATION INDIVIDUAL                                                                          Group Therapy Note    Date: 8/9/2021    Group Start Time: 1030  Group End Time: 1100  Group Topic: Community Meeting    [address] [facility] GROUP DOCUMENTATION GROUP    Group Therapy Note    Attendees: 5         Attendance: Attended    Patient's Goal:  To stay awake     Interventions/techniques: Promoted peer support    Follows Directions:  Followed directions    Interactions: Interacted appropriately    Mental Status: Calm    Behavior/appearance: Attentive and Cooperative    Goals Achieved: Able to engage in interactions, Able to listen to others, Able to give feedback to another, Able to reflect/comment on own behavior, Able to manage/cope with feelings and Able to receive feedback      Additional Notes:  Complains of ankle pain with a 8 out of 10 rating, denies anxiety, depression and suicidal ideations     Laurence Beaver

## 2021-08-09 NOTE — PROGRESS NOTES
Problem: Depressed Mood (Adult/Pediatric)    Goal: *STG: Attends activities and groups    Affect blunted, mood depressed. Denied SI or intent to harm self. Attended and participated in group. Assessed/monitored potential harm to self. Encouraged sharing of feelings. Monitored medication compliance  and effectiveness. Reinforced abilities and accomplishments. Offered encouragement, reassurance,and support.     Outcome: Progressing Towards Goal

## 2021-08-09 NOTE — ROUTINE PROCESS
Shift change report given to Isac Jean Baptiste RN, re: SBAR, medications, and behavior. Alexis Score - 1.

## 2021-08-10 VITALS
OXYGEN SATURATION: 99 % | HEART RATE: 56 BPM | DIASTOLIC BLOOD PRESSURE: 74 MMHG | WEIGHT: 158 LBS | SYSTOLIC BLOOD PRESSURE: 154 MMHG | TEMPERATURE: 97.4 F | BODY MASS INDEX: 28 KG/M2 | RESPIRATION RATE: 17 BRPM | HEIGHT: 63 IN

## 2021-08-10 PROCEDURE — 99239 HOSP IP/OBS DSCHRG MGMT >30: CPT | Performed by: PSYCHIATRY & NEUROLOGY

## 2021-08-10 PROCEDURE — 74011250637 HC RX REV CODE- 250/637: Performed by: PSYCHIATRY & NEUROLOGY

## 2021-08-10 RX ORDER — QUETIAPINE FUMARATE 50 MG/1
50 TABLET, FILM COATED ORAL
Qty: 30 TABLET | Refills: 0 | Status: SHIPPED | OUTPATIENT
Start: 2021-08-10

## 2021-08-10 RX ORDER — OXCARBAZEPINE 600 MG/1
600 TABLET, FILM COATED ORAL 2 TIMES DAILY
Qty: 60 TABLET | Refills: 0 | Status: SHIPPED | OUTPATIENT
Start: 2021-08-10

## 2021-08-10 RX ADMIN — HYDROXYZINE PAMOATE 50 MG: 50 CAPSULE ORAL at 08:55

## 2021-08-10 RX ADMIN — OXCARBAZEPINE 600 MG: 300 TABLET, FILM COATED ORAL at 08:56

## 2021-08-10 NOTE — BH NOTES
Discharge Note:      Patient discharged home today. Patient alert and oriented x 4. Speech clear and coherent. Patient answers questions appropriately. Patient denies SI/HI/AVH/pain. No delusional statements made. Patient is compliant with medications. Patient given Vistaril 50 mg po at 0855 with positive effects. Patient appetite good ate 100% breakfast.  Patient attended and engaged in group with prompting. Patient in no apparent distress all vital signs within normal limits. RN reviewed discharge orders and instructions with patient which included medication drug name, purpose, doses, administration times, route, and adverse effects. Patient verbalized understanding and provided written copies of discharge orders and instructions along with prescriptions. Patient left the unit @ 1005 ambulatory wearing a face mask and accompanied by staff. All personal valuables, belongings, and home medications sent with patient.

## 2021-08-10 NOTE — BH NOTES
Affect constricted, mood depressed/anxious. Alert and Oriented X 4. Cooperative and pleasant. Attended and participated in group. Interacted with staff and peers. Makes needs known. Ate  snacks. Denied SI/HI/AVH and pain. Medication compliant. Stable with no s/s of distress.  Laid in bed with eyes closed 7 hours and 45 min

## 2021-08-10 NOTE — DISCHARGE INSTRUCTIONS
Patient Education        Learning About Depression Screening  What is depression screening? Depression screening is a way to see if you have depression symptoms. It may be done by a doctor or counselor. It's often part of a routine checkup. That's because your mental health is just as important as your physical health. Depression is a medical illness that affects how you feel, think, and act. You may:  · Have less energy. · Lose interest in your daily activities. · Feel sad and grouchy for a long time. Depression is very common. It affects men and women of all ages. Many things can trigger depression. Some people become depressed after they have a stroke or find out they have a major illness like cancer or heart disease. The death of a loved one, a breakup, or changes in the natural brain chemicals may lead to depression. It can run in families. Most experts believe that a combination of inherited genes and stressful life events can cause it. What happens during screening? You may be asked to fill out a form about your depression symptoms. You and the doctor will discuss your answers. The doctor may ask you more questions to learn more about how you think, act, and feel. What happens after screening? If you have signs of depression, your doctor will talk to you about your options. Doctors usually treat depression with medicines or counseling. Often, combining the two works best. Many people don't get help because they think that they'll get over the depression on their own. But people with depression may not get better unless they get treatment. Many people feel embarrassed or ashamed about having depression. But it isn't a sign of personal weakness. It's not a character flaw. A person who is depressed is not \"crazy. \" Depression is caused by changes in the brain. A serious symptom of depression is thinking about death or suicide.  If you or someone you care about talks about this or about feeling hopeless, get help right away. It's important to know that depression can be treated. The first step toward feeling better is often just seeing that the problem exists. Where can you learn more? Go to http://www.gray.com/  Enter T185 in the search box to learn more about \"Learning About Depression Screening. \"  Current as of: September 23, 2020               Content Version: 12.8  © 2006-2021 [employer]. Care instructions adapted under license by [employer] (which disclaims liability or warranty for this information). If you have questions about a medical condition or this instruction, always ask your healthcare professional. [place] [address] any warranty or liability for your use of this information. Patient Education        Learning About Mood Disorders  What are mood disorders? Mood disorders are medical problems that affect how you feel. They can impact your moods, thoughts, and actions. Mood disorders include:  · Depression. This causes you to feel sad or hopeless for much of the time. · Bipolar disorder. This causes extreme mood changes from manic episodes of very high energy to extreme lows of depression. · Seasonal affective disorder (SAD). This is a type of depression that affects you during the same season each year. Most often people experience SAD during the fall and winter months when days are shorter and there is less light. What are the symptoms? Depression  You may:  · Feel sad or hopeless nearly every day. · Lose interest in or not get pleasure from most daily activities. You feel this way nearly every day. · Have low energy, changes in your appetite, or changes in how well you sleep. · Have trouble concentrating. · Think about death and suicide. Keep the numbers for these national suicide hotlines: 0-015-515-TALK (0-580.230.6688) and 0-679-VLVETZM (1-108.952.7548).  If you or someone you know talks about suicide or feeling hopeless, get help right away. Bipolar disorder  Symptoms depend on your mood swings. You may:  · Feel very happy, energetic, or on edge. · Feel like you need very little sleep. · Feel overly self-confident. · Do impulsive things, such as spending a lot of money. · Feel sad or hopeless. · Have racing thoughts or trouble thinking and making decisions. · Lose interest in things you have enjoyed in the past.  · Think about death and suicide. Keep the numbers for these national suicide hotlines: 0-054-780-TALK (0-396.650.2649) and 7-375-ZVJYENT (5-967.962.5275). If you or someone you know talks about suicide or feeling hopeless, get help right away. Seasonal affective disorder (SAD)  Symptoms come and go at about the same time each year. For most people with SAD, symptoms come during the winter when there is less daylight. You may:  · Feel sad, grumpy, delacruz, or anxious. · Lose interest in your usual activities. · Eat more and crave carbohydrates, such as bread and pasta. · Gain weight. · Sleep more and feel drowsy during the daytime. How are mood disorders treated? Mood disorders can be treated with medicines or counseling, or a combination of both. Medicines for depression and SAD may include antidepressants. Medicines for bipolar disorder may include:  · Mood stabilizers. · Antipsychotics. · Benzodiazepines. Counseling may involve cognitive-behavioral therapy. It teaches you how to change the ways you think and behave. This can help you stop thinking bad thoughts about yourself and your life. Light therapy is the main treatment for SAD. This therapy uses a special kind of lamp. You let the lamp shine on you at certain times, usually in the morning. This may help your symptoms during the months when there is less sunlight. Healthy lifestyle  Healthy lifestyle changes may help you feel better. · Be active often. You might try walking or strength training. · Eat a healthy diet. Include fruits, vegetables, lean proteins, and whole grains in your diet each day. · Keep a regular sleep schedule. Try for 8 hours of sleep a night. · Find ways to manage stress, such as relaxation exercises. · Avoid alcohol and illegal drugs. Follow-up care is a key part of your treatment and safety. Be sure to make and go to all appointments, and call your doctor if you are having problems. It's also a good idea to know your test results and keep a list of the medicines you take. Where can you learn more? Go to http://www.gray.com/  Enter Z126 in the search box to learn more about \"Learning About Mood Disorders. \"  Current as of: September 23, 2020               Content Version: 12.8  © 2006-2021 [employer]. Care instructions adapted under license by [employer] (which disclaims liability or warranty for this information). If you have questions about a medical condition or this instruction, always ask your healthcare professional. [place] [address] any warranty or liability for your use of this information. Patient Education        Substance Use Disorder: Care Instructions  Overview     You can improve your life and health by stopping your use of alcohol or drugs. When you don't drink or use drugs, you may feel and sleep better. You may get along better with your family, friends, and coworkers. There are medicines and programs that can help with substance use disorder. How can you care for yourself at home? · If you have been given medicine to help keep you sober or reduce your cravings, be sure to take it as prescribed. · Talk to your doctor about programs that can help you stop using drugs or drinking alcohol. · If your doctor prescribes disulfiram (Antabuse), do not drink any alcohol while you are taking this medicine. You may have severe, even life-threatening, side effects from even small amounts of alcohol.   · Do not tempt yourself by keeping alcohol or drugs in your home. · Learn how to say no when other people drink or use drugs. Or don't spend time with people who drink or use drugs. · Use the time and money spent on drinking or drugs to do something fun with your family or friends. Preventing a relapse  · Do not drink alcohol or use drugs at all. Using any amount of alcohol or drugs greatly increases your risk for relapse. · Seek help from organizations such as Alcoholics Anonymous, Narcotics Anonymous, or treatment facilities if you feel the need to drink alcohol or use drugs again. · Remember that recovery is a lifelong process. · Stay away from situations, friends, or places that may lead you to drink or use drugs. · Have a plan to spot and deal with relapse. Learn to recognize changes in your thinking that lead you to drink or use drugs. These are warning signs. Get help before you start to drink or use drugs again. · Get help as soon as you can if you relapse. Some people make a plan with another person that outlines what they want that person to do for them if they relapse. The plan usually includes how to handle the relapse and who to notify in case of relapse. · Don't give up. Remember that a relapse does not mean that you have failed. Use the experience to learn the triggers that lead you to drink or use drugs. Then quit again. Many people have several relapses before they are able to quit for good. Follow-up care is a key part of your treatment and safety. Be sure to make and go to all appointments, and call your doctor if you are having problems. It's also a good idea to know your test results and keep a list of the medicines you take. When should you call for help? Call  911 anytime you think you may need emergency care. For example, call if:    · You feel you cannot stop from hurting yourself or someone else.    Call your doctor now or seek immediate medical care if:    · You have serious withdrawal symptoms, such as confusion, hallucinations, severe trembling, or seizures. Watch closely for changes in your health, and be sure to contact your doctor if:    · You have a relapse.     · You need more help or support to stop. Where can you learn more? Go to http://www.gray.com/  Enter H573 in the search box to learn more about \"Substance Use Disorder: Care Instructions. \"  Current as of: June 29, 2020               Content Version: 12.8  © 2006-2021 [employer]. Care instructions adapted under license by [employer] (which disclaims liability or warranty for this information). If you have questions about a medical condition or this instruction, always ask your healthcare professional. [place] [address] any warranty or liability for your use of this information. BEHAVIORAL HEALTH NURSING DISCHARGE NOTE      The following personal items collected during your admission are returned to you:   Dental Appliance:    Vision: Visual Aid: Glasses, With patient  Hearing Aid:    Jewelry:    Clothing: Clothing: Shirt, Slippers, Socks  Other Valuables: Other Valuables: Cell Phone, Money (comment), Purse, Lighter/matches, [place] 1923, Eyeglasses  Valuables sent to safe:        PATIENT INSTRUCTIONS:    What to do at Home:    You may resume normal activities. Avoid making any critical decisions for at least 24-hours. Recommended diet: Regular Diet and Low fat, Low cholesterol. Recommended activity: Activity as tolerated    You are referred to Narcotics Anonymous (NA). National Suicide Prevention Line: 9-674-694-104-934-3596. [facility] 6-145.189.6886. Smoking Cessation Hotline 3-466-QUIT NOW (618-6666). If you have problems relating to your recovery, call your physician. The discharge information has been reviewed with the patient.   The patient verbalized understanding.
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
## 2021-08-10 NOTE — BH NOTES
Behavioral Health Transition Record to Provider    Patient Name: Franklin Sandifer  YOB: 1970  Medical Record Number: [id number]  Date of Admission: 8/2/2021  Date of Discharge: 8/10/2021    Attending Provider: Shira Mann MD  Discharging Provider: Shira Pratt MD  To contact this individual call [phone] and ask the  to page. If unavailable, ask to be transferred to [facility] Provider on call. A Behavioral Health Provider will be available on call 24/7 and during holidays     Primary Care Provider: Michael Villalobos NP    No Known Allergies    Admission Diagnosis: Substance induced mood disorder ([facility] [address] [F19.94]  Depression, unspecified depression type [F32.9]    * No surgery found *    Results for orders placed or performed during the hospital encounter of 08/02/21   LIPID PANEL   Result Value Ref Range    Cholesterol, total 131 <200 MG/DL    Triglyceride 39 <150 MG/DL    HDL Cholesterol 55 MG/DL    LDL, calculated 68.2 0 - 100 MG/DL    VLDL, calculated 7.8 MG/DL    CHOL/HDL Ratio 2.4 0.0 - 5.0     TSH 3RD GENERATION   Result Value Ref Range    TSH 2.18 0.36 - 3.74 uIU/mL   SAMPLES BEING HELD   Result Value Ref Range    SAMPLES BEING HELD 1SST     COMMENT        Add-on orders for these samples will be processed based on acceptable specimen integrity and analyte stability, which may vary by analyte. Immunizations administered during this encounter:   Immunization History   Administered Date(s) Administered    Influenza Vaccine [employer]) PF (>6 Mo Flulaval, Fluarix, and >3 Yrs [address] [place] [address]) 10/05/2016       Screening for Metabolic Disorders for Patients on Antipsychotic Medications  (Data obtained from the EMR)    Estimated Body Mass Index  Estimated body mass index is 27.99 kg/m² as calculated from the following:    Height as of this encounter: 5' 3\" (1.6 m). Weight as of this encounter: 71.7 kg (158 lb).      Vital Signs/Blood Pressure  Visit Vitals  BP (!) 154/74 (BP 1 Location: Left upper arm, BP Patient Position: Sitting)   Pulse (!) 56   Temp 97.4 °F (36.3 °C)   Resp 17   Ht 5' 3\" (1.6 m)   Wt 71.7 kg (158 lb)   SpO2 99%   Breastfeeding No   BMI 27.99 kg/m²       Hemoglobin A1c  No results found for: HBA1C, [id number]     Lipid Panel  Lab Results   Component Value Date/Time    Cholesterol, total 131 08/03/2021 07:45 AM    HDL Cholesterol 55 08/03/2021 07:45 AM    LDL, calculated 68.2 08/03/2021 07:45 AM    Triglyceride 39 08/03/2021 07:45 AM    CHOL/HDL Ratio 2.4 08/03/2021 07:45 AM       Discharge Diagnosis: Unspecified Mood/Depressive disorder (F33.9); Cocaine Use disorder with depressed mood (F14.24)    Discharge Plan: patient is being discharged to crisis stabilization at [place] [address]     Discharge Medication List and Instructions:   Current Discharge Medication List      START taking these medications    Details   OXcarbazepine (TRILEPTAL) 600 mg tablet Take 1 Tablet by mouth two (2) times a day. Qty: 60 Tablet, Refills: 0  Start date: 8/10/2021         CONTINUE these medications which have CHANGED    Details   QUEtiapine (SEROquel) 50 mg tablet Take 1 Tablet by mouth nightly.   Qty: 30 Tablet, Refills: 0  Start date: 8/10/2021         STOP taking these medications       fluticasone propionate (FLOVENT HFA) 110 mcg/actuation inhaler Comments:   Reason for Stopping:         predniSONE (DELTASONE) 20 mg tablet Comments:   Reason for Stopping:         FLUoxetine (PROZAC) 20 mg capsule Comments:   Reason for Stopping:         traZODone (DESYREL) 50 mg tablet Comments:   Reason for Stopping:         albuterol (PROVENTIL HFA, VENTOLIN HFA, PROAIR HFA) 90 mcg/actuation inhaler Comments:   Reason for Stopping:               Unresulted Labs (24h ago, onward)    None        To obtain results of studies pending at discharge, please contact [phone]    Follow-up Information     Follow up With Specialties Details Why [facility], Crisis  Go on 8/10/2021 will be going for crisis stabilization [address], NP Nurse Practitioner Go to As needed Cary Montemayor  [address] [facility]  [phone]            Advanced Directive:   Does the patient have an appointed surrogate decision maker? No  Does the patient have a Medical Advance Directive? No  Does the patient have a Psychiatric Advance Directive? No  If the patient does not have a surrogate or Medical Advance Directive AND Psychiatric Advance Directive, the patient was offered information on these advance directives Patient will complete at a later time      Patient Instructions: Please continue all medications until otherwise directed by physician. Tobacco Cessation Discharge Plan:   Is the patient a smoker and needs referral for smoking cessation? Yes  Patient referred to the following for smoking cessation with an appointment? Yes     Patient was offered medication to assist with smoking cessation at discharge? Refused  Was education for smoking cessation added to the discharge instructions? Yes    Alcohol/Substance Abuse Discharge Plan:   Does the patient have a history of substance/alcohol abuse and requires a referral for treatment? Yes  Patient referred to the following for substance/alcohol abuse treatment with an appointment? No  Patient was offered medication to assist with alcohol cessation at discharge? No  Was education for substance/alcohol abuse added to discharge instructions? Yes    Patient discharged to Home; provided to the patient/caregiver either in hard copy or electronically.

## 2021-08-10 NOTE — DISCHARGE SUMMARY
[facility] DISCHARGE    Name:  Gloria Mccoy  MR#:  [id number]  :  1970  ACCOUNT #:  [de-identified]  ADMIT DATE:  2021  DISCHARGE DATE:  08/10/2021      [facility] DISCHARGE SUMMARY    NOTE:  Greater than 35 minutes was spent in the preparation of this discharge. DISCHARGE DIAGNOSES:  AXIS I:  1. Unspecified Major Depressive disorder (F33.9). 2.  Cocaine use disorder with depressed mood (F14.24). AXIS II:  Deferred. AXIS III:  History of asthma; recent overdose. AXIS IV:  Stressors are moderate (homelessness). AXIS V:  Global Assessment of Functioning at discharge is 70-75. DISCHARGE MEDICATIONS:  1. Trileptal 600 mg b.i.d. (new) - - #60 pills with no refills. 2.  Seroquel 50 mg at bedtime - - #30 pills with no refills. Note: she was not prescribed a Nicoderm patch as she refused it and didn't use one in the hospital    DISPOSITION/FOLLOW-UP PLANS:  The patient is being discharged in stable condition later this morning on 08/10/2021. [address] [facility] under the referral for outpatient , Delta Diaz. This is part of the [address] [place] [address] program that she has also attends regularly. HOSPITAL COURSE:  As noted in the admission note, the patient is a 55-year-old single female, who has a lengthy past psychiatric history of depressive episodes as well as significant cocaine dependence. She was admitted voluntarily from the [facility] emergency room due to having increased suicidal thoughts and reports that having taken a small overdose, specifically \"a handful\" of BuSpar impulsively. She indicated she had been depressed for 1-2 days, and attributed a lot of it to being homeless and her \"daughter putting me out. \"  Additionally, she states there was some conflict with her ex-boyfriend and essentially she had nowhere to go.   She also had continued to use crack cocaine and her urine was positive for that substance, and likely was contributing to some of her significant dysphoria. At the time she was admitted, she was quite depressed, emotionally flat, but she responded extremely well to being in the hospital and the nature of the support that she received here. She was very pleasant, cooperative, and was not disruptive or oppositional in any way. She indicated that the antidepressants that she had been taking had really never been of much benefit, and that her bigger issue was her \"anger problem,\" and emotional instability. Because of that, we decided to start a trial of Trileptal to help with mood instability, and she tolerated the increased dose up to 6 mg b.i.d. without any side effects whatsoever. We did use trazodone for sleeping, but that was not very effective, and switched to a low dose of Seroquel, which seemed to work better. She therefore was discharged on the medicine regimen noted above, and was quite safe and stable for a number of days prior to discharge. Her biggest issue was her homelessness, but her  was able to arrange transition to a crisis stabilization program as noted above, in the [address] [place] [address]. She therefore is being discharged to that setting. She had no medical or physical issues while she was here, either. LABORATORY DATA:  On admission, she was minimally anemic with a hemoglobin of 10.9 and hematocrit of 36.9. Potassium was slightly low at 3.4. An EKG showed a normal QTC of 440 milliseconds. Urine drug screen was positive for cocaine and a blood alcohol level was 48.       Maura Mathur MD      [id number]  D:  08/10/2021 9:45  T:  08/10/2021 11:13  JOB #:  [id number]  CC:

## 2021-08-10 NOTE — GROUP NOTE
[facility]  GROUP DOCUMENTATION INDIVIDUAL                                                                          Group Therapy Note    Date: 8/9/2021    Group Start Time: 2000  Group End Time: 2030  Group Topic: Community Meeting    [facility]    Oleksandr Moore CNA    IP [facility] GROUP DOCUMENTATION GROUP    Group Therapy Note    Attendees: 2/5         Attendance: Attended    Patient's Goal: Watch television until 9:30p.m. Interventions/techniques: Supported    Follows Directions: Followed directions    Interactions: Interacted appropriately    Mental Status: Calm    Behavior/appearance: Cooperative    Goals Achieved: Able to engage in interactions      Additional Notes:  She has no anxiety or depression. She has stomach pain at an 8.     Mary Pereira CNA

## 2021-08-10 NOTE — BH NOTES
Shift change report given to Mini Harvey RN re: SBAR, medications, and behavior.   Alexis fall risk score 1

## 2022-03-19 PROBLEM — F33.9 MAJOR DEPRESSIVE DISORDER, RECURRENT, UNSPECIFIED (HCC): Status: ACTIVE | Noted: 2021-08-02

## 2022-03-19 PROBLEM — F32.A DEPRESSION: Status: ACTIVE | Noted: 2021-08-02

## 2023-02-09 ENCOUNTER — APPOINTMENT (OUTPATIENT)
Dept: CT IMAGING | Age: 53
End: 2023-02-09
Attending: EMERGENCY MEDICINE
Payer: MEDICAID

## 2023-02-09 ENCOUNTER — HOSPITAL ENCOUNTER (EMERGENCY)
Age: 53
Discharge: HOME | End: 2023-02-10
Attending: EMERGENCY MEDICINE
Payer: MEDICAID

## 2023-02-09 ENCOUNTER — APPOINTMENT (OUTPATIENT)
Dept: MRI IMAGING | Age: 53
End: 2023-02-09
Attending: EMERGENCY MEDICINE
Payer: MEDICAID

## 2023-02-09 DIAGNOSIS — H53.9 VISUAL DISTURBANCE: ICD-10-CM
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
## 2023-02-09 LAB
ALBUMIN SERPL-MCNC: 4.3 G/DL (ref 3.5–5.2)
ALBUMIN/GLOB SERPL: 1.4 (ref 1.1–2.2)
ALP SERPL-CCNC: 100 U/L (ref 35–104)
ALT SERPL-CCNC: 53 U/L (ref 10–35)
ANION GAP SERPL CALC-SCNC: 11 MMOL/L (ref 5–15)
AST SERPL-CCNC: 41 U/L (ref 10–35)
BASOPHILS # BLD: 0.1 K/UL (ref 0–1)
BASOPHILS NFR BLD: 1 % (ref 0–1)
BILIRUB SERPL-MCNC: 0.2 MG/DL (ref 0.2–1)
BUN SERPL-MCNC: 17 MG/DL (ref 6–20)
BUN/CREAT SERPL: 31 (ref 12–20)
CALCIUM SERPL-MCNC: 10 MG/DL (ref 8.6–10)
CHLORIDE SERPL-SCNC: 106 MMOL/L (ref 98–107)
CO2 SERPL-SCNC: 29 MMOL/L (ref 22–29)
CREAT SERPL-MCNC: 0.55 MG/DL (ref 0.5–0.9)
DIFFERENTIAL METHOD BLD: ABNORMAL
EOSINOPHIL # BLD: 0.4 K/UL (ref 0–0.4)
EOSINOPHIL NFR BLD: 6 %
ERYTHROCYTE [DISTWIDTH] IN BLOOD BY AUTOMATED COUNT: 13.2 % (ref 11.5–14.5)
GLOBULIN SER CALC-MCNC: 3 G/DL (ref 2–4)
GLUCOSE BLD STRIP.AUTO-MCNC: 96 MG/DL (ref 65–117)
GLUCOSE SERPL-MCNC: 100 MG/DL (ref 65–100)
HCT VFR BLD AUTO: 44.3 % (ref 35–47)
HGB BLD-MCNC: 14.5 G/DL (ref 11.5–16)
IMM GRANULOCYTES # BLD AUTO: 0 K/UL (ref 0–0.04)
IMM GRANULOCYTES NFR BLD AUTO: 0 % (ref 0–0.5)
LYMPHOCYTES # BLD: 3 K/UL (ref 0.8–3.5)
LYMPHOCYTES NFR BLD: 48 % (ref 12–49)
MCH RBC QN AUTO: 32.4 PG (ref 26–34)
MCHC RBC AUTO-ENTMCNC: 32.7 G/DL (ref 30–36.5)
MCV RBC AUTO: 99.1 FL (ref 80–99)
MONOCYTES # BLD: 0.6 K/UL (ref 0–1)
MONOCYTES NFR BLD: 10 % (ref 5–13)
NEUTS SEG # BLD: 2.2 K/UL (ref 1.8–8)
NEUTS SEG NFR BLD: 35 % (ref 32–75)
NRBC # BLD: 0 K/UL (ref 0–0.01)
NRBC BLD-RTO: 0 PER 100 WBC
PLATELET # BLD AUTO: 277 K/UL (ref 150–400)
PMV BLD AUTO: 11.2 FL (ref 8.9–12.9)
POTASSIUM SERPL-SCNC: 3.8 MMOL/L (ref 3.5–5.1)
PROT SERPL-MCNC: 7.3 G/DL (ref 6.4–8.3)
RBC # BLD AUTO: 4.47 M/UL (ref 3.8–5.2)
SERVICE CMNT-IMP: NORMAL
SODIUM SERPL-SCNC: 146 MMOL/L (ref 136–145)
WBC # BLD AUTO: 6.2 K/UL (ref 3.6–11)

## 2023-02-09 PROCEDURE — 80053 COMPREHEN METABOLIC PANEL: CPT

## 2023-02-09 PROCEDURE — 74011000636 HC RX REV CODE- 636: Performed by: EMERGENCY MEDICINE

## 2023-02-09 PROCEDURE — 72125 CT NECK SPINE W/O DYE: CPT

## 2023-02-09 PROCEDURE — 99285 EMERGENCY DEPT VISIT HI MDM: CPT

## 2023-02-09 PROCEDURE — 70551 MRI BRAIN STEM W/O DYE: CPT

## 2023-02-09 PROCEDURE — 36415 COLL VENOUS BLD VENIPUNCTURE: CPT

## 2023-02-09 PROCEDURE — 70450 CT HEAD/BRAIN W/O DYE: CPT

## 2023-02-09 PROCEDURE — 85025 COMPLETE CBC W/AUTO DIFF WBC: CPT

## 2023-02-09 PROCEDURE — 70496 CT ANGIOGRAPHY HEAD: CPT

## 2023-02-09 RX ORDER — ONDANSETRON 2 MG/ML
4 INJECTION INTRAMUSCULAR; INTRAVENOUS ONCE
Status: DISCONTINUED | OUTPATIENT
Start: 2023-02-09 | End: 2023-02-10 | Stop reason: HOSPADM

## 2023-02-09 RX ADMIN — IOPAMIDOL 100 ML: 755 INJECTION, SOLUTION INTRAVENOUS at 21:49

## 2023-02-10 VITALS
TEMPERATURE: 98.1 F | SYSTOLIC BLOOD PRESSURE: 151 MMHG | DIASTOLIC BLOOD PRESSURE: 88 MMHG | WEIGHT: 185 LBS | BODY MASS INDEX: 32.78 KG/M2 | RESPIRATION RATE: 20 BRPM | HEART RATE: 68 BPM | HEIGHT: 63 IN | OXYGEN SATURATION: 96 %

## 2023-02-10 PROCEDURE — 74011250636 HC RX REV CODE- 250/636: Performed by: EMERGENCY MEDICINE

## 2023-02-10 PROCEDURE — 96374 THER/PROPH/DIAG INJ IV PUSH: CPT

## 2023-02-10 RX ORDER — KETOROLAC TROMETHAMINE 30 MG/ML
15 INJECTION, SOLUTION INTRAMUSCULAR; INTRAVENOUS ONCE
Status: COMPLETED | OUTPATIENT
Start: 2023-02-10 | End: 2023-02-10

## 2023-02-10 RX ADMIN — KETOROLAC TROMETHAMINE 15 MG: 30 INJECTION, SOLUTION INTRAMUSCULAR; INTRAVENOUS at 00:54
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
## 2023-02-10 NOTE — ED NOTES
Pt has requested food. Informed her that RN will ask MD if she's able to eat at this time. Pt verbalized understanding.

## 2023-02-10 NOTE — ED NOTES
Pt denies nausea but appreciates 9/10 neck and head pain. Offered pain relief medication. Pt states able to take anything that is NOT a narcotic. Dr. Laura Ho notified and Toradol requested for pt. Order to placed for Toradol.
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
## 2023-02-10 NOTE — ED NOTES
During ct pt because nauseous and had to stop scan and sit up to vomit. Pt willing to continue with testing after vomiting x1. Pt tolerated rest of testing.

## 2023-02-10 NOTE — ED NOTES
Stroke Education provided to patient and the following topics were discussed    1. Patients personal risk factors for stroke are hypertension and smoking    2. Warning signs of Stroke:        * Sudden numbness or weakness of the face, arm or leg, especially on one side of          The body            * Sudden confusion, trouble speaking or understanding        * Sudden trouble seeing in one or both eyes        * Sudden trouble walking, dizziness, loss of balance or coordination        * Sudden severe headache with no known cause      3. Importance of activation Emergency Medical Services ( 9-1-1 ) immediately if experience any warning signs of stroke. 4. Be sure and schedule a follow-up appointment with your primary care doctor or any specialists as instructed. 5. You must take medicine every day to treat your risk factors for stroke. Be sure to take your medicines exactly as your doctor tells you: no more, no less. Know what your medicines are for , what they do. Anti-thrombotics /anticoagulants can help prevent strokes. You are taking the following medicine(s)       6.  Smoking and second-hand smoke greatly increase your risk of stroke, cardiovascular disease and death. Smoking history cigarettes, 1 pack day    7. Information provided was Verbal Education    8. Documentation of teaching completed in Patient Education Activity and on Care Plan with teaching response noted?   yes

## 2023-10-16 ENCOUNTER — HOSPITAL ENCOUNTER (EMERGENCY)
Facility: HOSPITAL | Age: 53
Discharge: HOME | End: 2023-10-17
Attending: EMERGENCY MEDICINE
Payer: COMMERCIAL

## 2023-10-16 DIAGNOSIS — T40.601A OPIATE OVERDOSE, ACCIDENTAL OR UNINTENTIONAL, INITIAL ENCOUNTER (HCC): Primary | ICD-10-CM

## 2023-10-16 PROCEDURE — 6360000002 HC RX W HCPCS: Performed by: EMERGENCY MEDICINE

## 2023-10-16 PROCEDURE — 96374 THER/PROPH/DIAG INJ IV PUSH: CPT

## 2023-10-16 PROCEDURE — 96361 HYDRATE IV INFUSION ADD-ON: CPT

## 2023-10-16 PROCEDURE — 99284 EMERGENCY DEPT VISIT MOD MDM: CPT

## 2023-10-16 RX ORDER — PROMETHAZINE HYDROCHLORIDE 25 MG/1
25 TABLET ORAL
Status: COMPLETED | OUTPATIENT
Start: 2023-10-16 | End: 2023-10-17

## 2023-10-16 RX ORDER — ONDANSETRON 2 MG/ML
4 INJECTION INTRAMUSCULAR; INTRAVENOUS ONCE
Status: COMPLETED | OUTPATIENT
Start: 2023-10-16 | End: 2023-10-16

## 2023-10-16 RX ADMIN — ONDANSETRON 4 MG: 2 INJECTION INTRAMUSCULAR; INTRAVENOUS at 22:22

## 2023-10-16 ASSESSMENT — LIFESTYLE VARIABLES
HOW OFTEN DO YOU HAVE A DRINK CONTAINING ALCOHOL: MONTHLY OR LESS
HOW MANY STANDARD DRINKS CONTAINING ALCOHOL DO YOU HAVE ON A TYPICAL DAY: 1 OR 2

## 2023-10-16 ASSESSMENT — PAIN - FUNCTIONAL ASSESSMENT: PAIN_FUNCTIONAL_ASSESSMENT: NONE - DENIES PAIN

## 2023-10-17 VITALS
TEMPERATURE: 97.7 F | RESPIRATION RATE: 14 BRPM | OXYGEN SATURATION: 96 % | SYSTOLIC BLOOD PRESSURE: 117 MMHG | HEART RATE: 60 BPM | DIASTOLIC BLOOD PRESSURE: 71 MMHG | HEIGHT: 63 IN | BODY MASS INDEX: 30.41 KG/M2 | WEIGHT: 171.6 LBS

## 2023-10-17 LAB
GLUCOSE BLD STRIP.AUTO-MCNC: 105 MG/DL (ref 65–117)
SERVICE CMNT-IMP: NORMAL

## 2023-10-17 PROCEDURE — 6370000000 HC RX 637 (ALT 250 FOR IP): Performed by: EMERGENCY MEDICINE

## 2023-10-17 PROCEDURE — 2580000003 HC RX 258: Performed by: EMERGENCY MEDICINE

## 2023-10-17 PROCEDURE — 82962 GLUCOSE BLOOD TEST: CPT

## 2023-10-17 RX ORDER — 0.9 % SODIUM CHLORIDE 0.9 %
1000 INTRAVENOUS SOLUTION INTRAVENOUS ONCE
Status: COMPLETED | OUTPATIENT
Start: 2023-10-17 | End: 2023-10-17

## 2023-10-17 RX ORDER — MECLIZINE HCL 12.5 MG/1
25 TABLET ORAL
Status: COMPLETED | OUTPATIENT
Start: 2023-10-17 | End: 2023-10-17

## 2023-10-17 RX ADMIN — SODIUM CHLORIDE 1000 ML: 9 INJECTION, SOLUTION INTRAVENOUS at 01:24

## 2023-10-17 RX ADMIN — PROMETHAZINE HYDROCHLORIDE 25 MG: 25 TABLET ORAL at 00:11

## 2023-10-17 RX ADMIN — MECLIZINE 25 MG: 12.5 TABLET ORAL at 03:11

## 2023-10-17 NOTE — ED PROVIDER NOTES
[facility] EMERGENCY DEPT  EMERGENCY DEPARTMENT ENCOUNTER       Pt Name: Sandy Davis  MRN: [id number]  [address] [facility] 1970  Date of evaluation: 10/16/2023  Provider: Darryl Garcia MD   PCP: EDITH Ling NP  Note Started: 9:11 PM EDT 10/16/23     CHIEF COMPLAINT       Chief Complaint   Patient presents with    Drug Overdose        HISTORY OF PRESENT ILLNESS: 1 or more elements      History From: patient/ems, History limited by: none     Sandy Davis is a 48 y.o. female who presents via EMS after presumed opioid overdose       Please See MDM for Additional Details of the HPI/PMH  Nursing Notes were all reviewed and agreed with or any disagreements were addressed in the HPI. REVIEW OF SYSTEMS        Positives and Pertinent negatives as per HPI. PAST HISTORY     Past Medical History:  Past Medical History:   Diagnosis Date    Substance abuse [facility])        Past Surgical History:  Past Surgical History:   Procedure Laterality Date     SECTION      TUBAL LIGATION         Family History:  Family History   Problem Relation Age of Onset    Diabetes Sister     Hypertension Father     Hypertension Mother     Diabetes Brother     Hypertension Brother     Hypertension Sister        Social History:  Social History     Tobacco Use    Smoking status: Every Day     Packs/day: 2     Types: Cigarettes    Smokeless tobacco: Never   Substance Use Topics    Alcohol use:  Yes     Alcohol/week: 0.8 standard drinks of alcohol    Drug use: Yes     Types: Cocaine       Allergies:  No Known Allergies    CURRENT MEDICATIONS      Previous Medications    OXCARBAZEPINE (TRILEPTAL) 600 MG TABLET    Take by mouth 2 times daily    QUETIAPINE (SEROQUEL) 50 MG TABLET    Take by mouth       SCREENINGS               No data recorded         PHYSICAL EXAM      ED Triage Vitals   Enc Vitals Group      BP       Pulse       Resp       Temp       Temp src       SpO2       Weight       Height       Head Circumference       Peak

## 2023-10-17 NOTE — ED NOTES
Discharge instructions were given to the patient by [facility]. The patient left the Emergency Department ambulatory, alert and oriented and in no acute distress with 0 prescriptions. The patient was encouraged to call or return to the ED for worsening issues or problems and was encouraged to schedule a follow up appointment for continuing care. The patient verbalized understanding of discharge instructions and prescriptions, all questions were answered. The patient has no further concerns at this time.         Kiko Morton RN  10/17/23 2371

## 2023-10-17 NOTE — ED NOTES
Pt given water and crackers   Pt did not tolerate  Elissa Munguia, RN  10/16/23 [facility]L  10/17/23 9702
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
## 2023-10-17 NOTE — ED NOTES
Pt still unsteady on her feel. Assisted back to bed. Provided pt with snacks. Pt resting in room and has agreed to retry ambulating again in an hour. MD aware.      Chad Velazquez RN  10/17/23 0224

## 2025-05-20 ENCOUNTER — APPOINTMENT (OUTPATIENT)
Facility: HOSPITAL | Age: 55
End: 2025-05-20
Payer: MEDICAID

## 2025-05-20 ENCOUNTER — HOSPITAL ENCOUNTER (EMERGENCY)
Facility: HOSPITAL | Age: 55
Discharge: HOME | End: 2025-05-20
Payer: MEDICAID

## 2025-05-20 VITALS
BODY MASS INDEX: 27.74 KG/M2 | TEMPERATURE: 98.4 F | DIASTOLIC BLOOD PRESSURE: 91 MMHG | RESPIRATION RATE: 16 BRPM | WEIGHT: 162.5 LBS | SYSTOLIC BLOOD PRESSURE: 145 MMHG | HEART RATE: 74 BPM | HEIGHT: 64 IN | OXYGEN SATURATION: 97 %

## 2025-05-20 DIAGNOSIS — M79.673 ACUTE FOOT PAIN, UNSPECIFIED LATERALITY: Primary | ICD-10-CM

## 2025-05-20 DIAGNOSIS — M25.572 ACUTE LEFT ANKLE PAIN: ICD-10-CM

## 2025-05-20 DIAGNOSIS — L84 CORN OF FOOT: ICD-10-CM

## 2025-05-20 PROCEDURE — 73610 X-RAY EXAM OF ANKLE: CPT

## 2025-05-20 PROCEDURE — 73620 X-RAY EXAM OF FOOT: CPT

## 2025-05-20 PROCEDURE — 99283 EMERGENCY DEPT VISIT LOW MDM: CPT

## 2025-05-20 RX ORDER — NAPROXEN 500 MG/1
500 TABLET ORAL 2 TIMES DAILY PRN
Qty: 30 TABLET | Refills: 0 | Status: SHIPPED | OUTPATIENT
Start: 2025-05-20

## 2025-05-20 ASSESSMENT — PAIN DESCRIPTION - ORIENTATION: ORIENTATION: LEFT;RIGHT

## 2025-05-20 ASSESSMENT — PAIN SCALES - GENERAL: PAINLEVEL_OUTOF10: 10

## 2025-05-20 ASSESSMENT — PAIN DESCRIPTION - LOCATION: LOCATION: FOOT

## 2025-05-20 ASSESSMENT — PAIN - FUNCTIONAL ASSESSMENT: PAIN_FUNCTIONAL_ASSESSMENT: 0-10

## 2025-05-20 NOTE — ED TRIAGE NOTES
Pt presents ambulatory to ED complaining of bilateral feet pain. Pt reports she hit posterior right foot on something she doesn't remember x3 weeks ago. Pt then states that she twisted left ankle a few weeks ago while stepping off of curb and has blister on bottom of foot. Pt reports increased pain. Pt reports taking Ibuprofen PTA with no relief.

## 2025-05-20 NOTE — DISCHARGE INSTRUCTIONS
Follow-up with podiatry   Return precautions as we discussed     Thank You!    It was a pleasure taking care of you in our Emergency Department today. We know that when you come to [facility], you are entrusting us with your health, comfort, and safety. Our clinicians honor that trust, and truly appreciate the opportunity to care for you and your loved ones.    If you receive a survey about your Emergency Department experience today, please fill it out.  We value your feedback. Thank you.      Anuradha Little PA-C    ___________________________________  I have included a copy of your lab results and/or radiologic studies from today's visit so you can have them easily available at your follow-up visit.   No results found for this or any previous visit (from the past 12 hours).    XR FOOT RIGHT (2 VIEWS)   Final Result   No acute abnormality.      Electronically signed by ZULEIKA MELIVN      XR ANKLE LEFT (MIN 3 VIEWS)   Final Result      No acute fracture.         Electronically signed by ZULEIKA MELVIN        [unfilled]
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
## 2025-05-20 NOTE — ED NOTES
Discharge instructions were given to the patient by Ishaan Fay RN.  The patient left the Emergency Department alert and oriented and in no acute distress with 2 prescription(s). The patient was encouraged to call or return to the ED for worsening issues or problems and was encouraged to schedule a follow up appointment for continuing care.   The patient verbalized understanding of discharge instructions and prescriptions; all questions were answered. The patient has no further concerns at this time.

## 2025-05-20 NOTE — ED NOTES
Pt presents to ED complaining of 8/10 bilateral ankle pain that radiates down the foot. Pt states they twisted their ankle 3 weeks ago, and have received blisters on their feet since then. Pt denies taking any medications for their symptoms.     Pt is alert and oriented x 4, RR even and unlabored, skin is warm and dry. Pt appears in NAD at this time. Assessment completed and pt updated on plan of care.  Call bell in reach.  Emergency Department Nursing Plan of Care  The Nursing Plan of Care is developed from the Nursing assessment and Emergency Department Attending provider initial evaluation.  The plan of care may be reviewed in the “ED Provider note”.  The Plan of Care was developed with the following considerations:  Patient / Family readiness to learn indicated by:Refer to Medical chart in [facility]  Persons(s) to be included in education: Refer to Medical chart in [facility]  Barriers to Learning/Limitations:Normal